# Patient Record
Sex: FEMALE | Race: WHITE | NOT HISPANIC OR LATINO | Employment: FULL TIME | URBAN - METROPOLITAN AREA
[De-identification: names, ages, dates, MRNs, and addresses within clinical notes are randomized per-mention and may not be internally consistent; named-entity substitution may affect disease eponyms.]

---

## 2017-09-14 ENCOUNTER — HOSPITAL ENCOUNTER (OUTPATIENT)
Dept: ULTRASOUND IMAGING | Facility: CLINIC | Age: 42
Discharge: HOME | End: 2017-09-14
Attending: OBSTETRICS & GYNECOLOGY

## 2017-09-14 ENCOUNTER — RECORDS - HEALTHEAST (OUTPATIENT)
Dept: ADMINISTRATIVE | Facility: OTHER | Age: 42
End: 2017-09-14

## 2017-09-14 ENCOUNTER — COMMUNICATION - HEALTHEAST (OUTPATIENT)
Dept: TELEHEALTH | Facility: CLINIC | Age: 42
End: 2017-09-14

## 2017-09-14 DIAGNOSIS — R10.2 PELVIC PAIN: ICD-10-CM

## 2017-10-18 ENCOUNTER — E-VISIT (OUTPATIENT)
Dept: FAMILY MEDICINE | Facility: CLINIC | Age: 42
End: 2017-10-18
Payer: COMMERCIAL

## 2017-10-18 ENCOUNTER — TELEPHONE (OUTPATIENT)
Dept: FAMILY MEDICINE | Facility: CLINIC | Age: 42
End: 2017-10-18

## 2017-10-18 DIAGNOSIS — Z71.84 COUNSELING ABOUT TRAVEL: ICD-10-CM

## 2017-10-18 PROCEDURE — 98969 ZZC NONPHYSICIAN ONLINE ASSESSMENT AND MANAGEMENT: CPT | Performed by: PHYSICIAN ASSISTANT

## 2017-10-18 NOTE — TELEPHONE ENCOUNTER
"Reason for Call:  Medication or medication refill:    Do you use a [facility]?  Name of the pharmacy and phone number for the current request:  [facility], [phone], [place] [phone]    Name of the medication requested: Ambien ( or generic)    Other request: Please call the patient if there are any problems with this request.  Patient states she has been to see the OBGYN for her \"girl\" physical within the last year.    Can we leave a detailed message on this number? YES    Phone number patient can be reached at: Cell number on file:    No relevant phone numbers on file.       Best Time: amytime    Call taken on 10/18/2017 at 11:47 AM by Sejal Fitzpatrick      "

## 2017-10-18 NOTE — MR AVS SNAPSHOT
After Visit Summary   10/18/2017    Dasia Reddy    MRN: [id number]           Patient Information     Date Of Birth          1975        Visit Information        Provider Department      10/18/2017 3:01 PM Jimenez Silva PA-C [facility]        Today's Diagnoses     Counseling about travel           Follow-ups after your visit        Who to contact     If you have questions or need follow up information about today's clinic visit or your schedule please contact [facility] directly at [phone].  Normal or non-critical lab and imaging results will be communicated to you by [employer], letter or phone within 4 business days after the clinic has received the results. If you do not hear from us within 7 days, please contact the clinic through [employer] or phone. If you have a critical or abnormal lab result, we will notify you by phone as soon as possible.  Submit refill requests through [email or web address] or call your pharmacy and they will forward the refill request to us. Please allow 3 business days for your refill to be completed.          Additional Information About Your Visit        MyChart Information     [email or web address] gives you secure access to your electronic health record. If you see a primary care provider, you can also send messages to your care team and make appointments. If you have questions, please call your primary care clinic.  If you do not have a primary care provider, please call [phone] and they will assist you.        Care EveryWhere ID     This is your Care EveryWhere ID. This could be used by other organizations to access your [facility] medical records  [id number]         Blood Pressure from Last 3 Encounters:   12/26/16 112/64   11/01/16 114/68   11/05/15 100/60    Weight from Last 3 Encounters:   12/26/16 156 lb (70.8 kg)   11/01/16 147 lb (66.7 kg)   11/05/15 151 lb (68.5 kg)              Today, you had the following     No orders found for  display         Where to get your medicines      Some of these will need a paper prescription and others can be bought over the counter.  Ask your nurse if you have questions.     Bring a paper prescription for each of these medications     zolpidem 10 MG tablet          Primary Care Provider Office Phone # Fax #    Jimenez Silva PA-C [phone]       [address] [facility] [address]        Equal Access to Services     MARYBETH DURBIN : Hadii aad ku hadasho Soomaali, waaxda luqadaha, qaybta kaalmada adeegyada, waxay idiin hayaan adeeg khalexissh latorin . So [facility] [phone].    ATENCIÓN: Si habla espstella, tiene a cotto disposición servicios gratuitos de asistencia lingüística. Llame al [phone].    We comply with applicable federal civil rights laws and [place] laws. We do not discriminate on the basis of race, color, national origin, age, disability, sex, sexual orientation, or gender identity.            Thank you!     Thank you for choosing [facility]  for your care. Our goal is always to provide you with excellent care. Hearing back from our patients is one way we can continue to improve our services. Please take a few minutes to complete the written survey that you may receive in the mail after your visit with us. Thank you!             Your Updated Medication List - Protect others around you: Learn how to safely use, store and throw away your medicines at www.disposemymeds.org.          This list is accurate as of: 10/18/17 11:59 PM.  Always use your most recent med list.                   Brand Name Dispense Instructions for use Diagnosis    DRYSOL 20 % external solution   Generic drug:  aluminum chloride     60 mL    apply every 2nd or 3rd night as needed    Hyperhidrosis       MIRENA (52 MG) 20 MCG/24HR IUD   Generic drug:  levonorgestrel           Multi-vitamin Tabs tablet   Generic drug:  multivitamin, therapeutic with minerals     30    1 TABLET DAILY         zolpidem 10 MG tablet    AMBIEN    15 tablet    1/2 tab as needed for travel related sleep issues    Counseling about travel

## 2017-10-18 NOTE — TELEPHONE ENCOUNTER
Would need a related visit because it has been a year for this particular specific controlled substance. Can do a simple E-Visit.  Thanks.  Biju

## 2017-10-19 RX ORDER — ZOLPIDEM TARTRATE 10 MG/1
TABLET ORAL
Qty: 15 TABLET | Refills: 3 | Status: SHIPPED | OUTPATIENT
Start: 2017-10-19 | End: 2018-11-02

## 2017-12-13 ENCOUNTER — TELEPHONE (OUTPATIENT)
Dept: FAMILY MEDICINE | Facility: CLINIC | Age: 42
End: 2017-12-13

## 2017-12-13 NOTE — TELEPHONE ENCOUNTER
"Dasia Reddy is a 42 year old female who calls with allergic reaction.    NURSING ASSESSMENT:  Description:  For the past 3 days, patient has had swelling around her eyes and a bumpy texture to the skin on her face and around her eyes. She states her upper eyelids are full of fluids. It is progressively getting better, but it is still \"pretty bad.\" She has tried benadryl and claritin with no improvement. Last week she had botox and another injection in her face, and she also tried a new powder mix for a smoothie. She wonders if these could be causes of her reaction. She denies dyspnea, chest pain, swelling of tongue, throat or mouth/lips, dizziness, vision changes, hoarseness. No new medication.  Onset/duration:  3 days  Precip. factors:  botox injections  Associated symptoms:  See description  Improves/worsens symptoms:  n/a  Pain scale (0-10)   0/10  Allergies:   Allergies   Allergen Reactions     Codecon-C [Codeine]      NURSING PLAN: Nursing advice to patient see provider in 2-4 hours    RECOMMENDED DISPOSITION:  See in 4 hours  Will comply with recommendation: No- Barriers to comply with plan of care patient wants to be seen in office, no appointments today. She will go to ER if symptoms progress..  If further questions/concerns or if symptoms do not improve, worsen or new symptoms develop, call your PCP or [facility] as soon as possible.      Guideline used:  Telephone Triage Protocols for Nurses, Fifth Edition, Elif Obrien \"allergic reaction\"    Ozzy Arellano RN    "

## 2017-12-13 NOTE — TELEPHONE ENCOUNTER
Reason for call:  Patient reporting a symptom    Symptom or request: Patient calling stating that she is having an allergic reaction to something. This is the 3rd day. She has swollen eyes and her skin is dry and feels like a bumpy rash but there is no discoloration. No shortness of breath.    Duration (how long have symptoms been present): see above    Have you been treated for this before? No    Additional comments: Patient uses [facility]    Phone Number patient can be reached at:  Home number on file [phone] (home)    Best Time:  any    Can we leave a detailed message on this number:  YES    Call taken on 12/13/2017 at 12:06 PM by Paris Hinkle

## 2017-12-14 ENCOUNTER — OFFICE VISIT (OUTPATIENT)
Dept: FAMILY MEDICINE | Facility: CLINIC | Age: 42
End: 2017-12-14
Payer: COMMERCIAL

## 2017-12-14 VITALS
HEART RATE: 70 BPM | WEIGHT: 154 LBS | OXYGEN SATURATION: 96 % | HEIGHT: 66 IN | BODY MASS INDEX: 24.75 KG/M2 | DIASTOLIC BLOOD PRESSURE: 62 MMHG | TEMPERATURE: 98.7 F | SYSTOLIC BLOOD PRESSURE: 108 MMHG

## 2017-12-14 DIAGNOSIS — T78.40XA ALLERGIC REACTION, INITIAL ENCOUNTER: Primary | ICD-10-CM

## 2017-12-14 PROCEDURE — 99213 OFFICE O/P EST LOW 20 MIN: CPT | Performed by: PHYSICIAN ASSISTANT

## 2017-12-14 NOTE — PROGRESS NOTES
"  SUBJECTIVE:   Dasia Reddy is a 42 year old female who presents to clinic today for the following health issues:  Concern - Facial Swelling   Onset: 4 days     Description:   Facial swelling for the past 4 days. She recently had Botox injections 7 days ago, she has had Botox in the past with not issues. Went to a new clinic this time. Also started using a new smoothie mix. Organifi.     Intensity: moderate    Progression of Symptoms:  improving    Accompanying Signs & Symptoms:  Swelling     Previous history of similar problem:   Yes-had an allergic reaction, possibly from a silk pillow she had recently bought.     Precipitating factors:   Worsened by: Unknown     Alleviating factors:  Improved by: None     Therapies Tried and outcome: Benadryl and Claritin have not helped     History of breaking out with eating seaweed.     Has been trying benadryl, claritin, and pepcid AC.     Problem list and histories reviewed & adjusted, as indicated.  Additional history: as documented    Labs reviewed in EPIC    Reviewed and updated as needed this visit by clinical staff     Reviewed and updated as needed this visit by Provider         ROS:  Constitutional, HEENT, cardiovascular, pulmonary, gi and gu systems are negative, except as otherwise noted.      OBJECTIVE:   /62 (BP Location: Right arm, Cuff Size: Adult Regular)  Pulse 70  Temp 98.7  F (37.1  C) (Oral)  Ht 5' 5.75\" (1.67 m)  Wt 154 lb (69.9 kg)  SpO2 96%  BMI 25.05 kg/m2  Body mass index is 25.05 kg/(m^2).  GENERAL: healthy, alert and no distress  HENT: ear canals and TM's normal, nose and mouth without ulcers or lesions  NECK: no adenopathy, no asymmetry, masses, or scars and thyroid normal to palpation  RESP: lungs clear to auscultation - no rales, rhonchi or wheezes  CV: regular rate and rhythm, normal S1 S2, no S3 or S4, no murmur, click or rub, no peripheral edema and peripheral pulses strong  SKIN: Has some mild facial swelling - mild erythema, no " hives, no keratosis, scaling, etc     ASSESSMENT/PLAN:     (T78.40XA) Allergic reaction, initial encounter  (primary encounter diagnosis)  Comment:   Plan: Unclear - she's had Botox, Juvederm, takes a few supplements / herbal mixes in the past week that are somewhat new. I reviewed the ingredients, most are fairly benign - I think the Juvederm might be the culprit because the meds / supplements she had used in the past, Botox she had done and Juvederm as well - I recommended that she hold off on the supplements for now - could reintroduce one at a time at a future date - should review allergic / facial reaction with her derm clinic.     Will do OTC Zantac and Zyrtec daily for a week or so. Warning symptoms of worsening condition discussed and patient shows good understanding.     HAKEEM HUNT PA-C  [facility]

## 2017-12-14 NOTE — MR AVS SNAPSHOT
"              After Visit Summary   12/14/2017    Dasia Reddy    MRN: [id number]           Patient Information     Date Of Birth          1975        Visit Information        Provider Department      12/14/2017 6:40 AM Jimneez Silva PA-C [facility]        Today's Diagnoses     Allergic reaction, initial encounter    -  1      Care Instructions    1. Zyrtec 10 mg daily and Zantac 150 mg, 1 pill twice daily for 1 week           Follow-ups after your visit        Who to contact     If you have questions or need follow up information about today's clinic visit or your schedule please contact [facility] directly at [phone].  Normal or non-critical lab and imaging results will be communicated to you by [employer], letter or phone within 4 business days after the clinic has received the results. If you do not hear from us within 7 days, please contact the clinic through [employer]art or phone. If you have a critical or abnormal lab result, we will notify you by phone as soon as possible.  Submit refill requests through [employer] or call your pharmacy and they will forward the refill request to us. Please allow 3 business days for your refill to be completed.          Additional Information About Your Visit        MyChart Information     [employer] gives you secure access to your electronic health record. If you see a primary care provider, you can also send messages to your care team and make appointments. If you have questions, please call your primary care clinic.  If you do not have a primary care provider, please call [phone] and they will assist you.        Care EveryWhere ID     This is your Care EveryWhere ID. This could be used by other organizations to access your [place] medical records  [id number]        Your Vitals Were     Pulse Temperature Height Pulse Oximetry BMI (Body Mass Index)       70 98.7  F (37.1  C) (Oral) 5' 5.75\" (1.67 m) 96% 25.05 kg/m2        Blood " Pressure from Last 3 Encounters:   12/14/17 108/62   12/26/16 112/64   11/01/16 114/68    Weight from Last 3 Encounters:   12/14/17 154 lb (69.9 kg)   12/26/16 156 lb (70.8 kg)   11/01/16 147 lb (66.7 kg)              Today, you had the following     No orders found for display       Primary Care Provider Office Phone # Fax #    Jimenez Silva PA-C [phone]       [address] [place] [address] [address]        Equal Access to Services     [facility]: Hadii aad ku hadasho Soomaali, waaxda luqadaha, qaybta kaalmada adeegyada, nori bhardwaj . So [facility] [phone].    ATENCIÓN: Si habla español, tiene a cotto disposición servicios gratuitos de asistencia lingüística. Llame al [phone].    We comply with applicable federal civil rights laws and [place] laws. We do not discriminate on the basis of race, color, national origin, age, disability, sex, sexual orientation, or gender identity.            Thank you!     Thank you for choosing [facility]  for your care. Our goal is always to provide you with excellent care. Hearing back from our patients is one way we can continue to improve our services. Please take a few minutes to complete the written survey that you may receive in the mail after your visit with us. Thank you!             Your Updated Medication List - Protect others around you: Learn how to safely use, store and throw away your medicines at www.disposemymeds.org.          This list is accurate as of: 12/14/17  7:17 AM.  Always use your most recent med list.                   Brand Name Dispense Instructions for use Diagnosis    DRYSOL 20 % external solution   Generic drug:  aluminum chloride     60 mL    apply every 2nd or 3rd night as needed    Hyperhidrosis       MIRENA (52 MG) 20 MCG/24HR IUD   Generic drug:  levonorgestrel           Multi-vitamin Tabs tablet   Generic drug:  multivitamin, therapeutic with minerals     30    1  TABLET DAILY        zolpidem 10 MG tablet    AMBIEN    15 tablet    1/2 tab as needed for travel related sleep issues    Counseling about travel

## 2018-03-15 ENCOUNTER — OFFICE VISIT (OUTPATIENT)
Dept: FAMILY MEDICINE | Facility: CLINIC | Age: 43
End: 2018-03-15
Payer: COMMERCIAL

## 2018-03-15 VITALS
WEIGHT: 152 LBS | OXYGEN SATURATION: 97 % | BODY MASS INDEX: 24.43 KG/M2 | TEMPERATURE: 98.6 F | HEIGHT: 66 IN | HEART RATE: 74 BPM | DIASTOLIC BLOOD PRESSURE: 67 MMHG | SYSTOLIC BLOOD PRESSURE: 100 MMHG

## 2018-03-15 DIAGNOSIS — Z01.818 PREOP GENERAL PHYSICAL EXAM: Primary | ICD-10-CM

## 2018-03-15 DIAGNOSIS — H02.403 PTOSIS OF BOTH EYELIDS: ICD-10-CM

## 2018-03-15 DIAGNOSIS — L98.7 REDUNDANT SKIN OF ABDOMEN: ICD-10-CM

## 2018-03-15 PROCEDURE — 99214 OFFICE O/P EST MOD 30 MIN: CPT | Performed by: PHYSICIAN ASSISTANT

## 2018-03-15 NOTE — PROGRESS NOTES
[address] [id number] [phone]  Dept: [phone]    PRE-OP EVALUATION:  Today's date: 3/15/2018    Dasia Reddy (: 1975) presents for pre-operative evaluation assessment as requested by Dr. Garza.  She requires evaluation and anesthesia risk assessment prior to undergoing surgery/procedure for treatment of eyelids .    Fax number for surgical facility: [phone]  Primary Physician: Jimenez Silva  Type of Anesthesia Anticipated: to be determined    Patient has a Health Care Directive or Living Will:  YES    Preop Questions 3/15/2018   Who is doing your surgery? dr garza   What are you having done? eyelids and fat removed   Date of Surgery/Procedure: 18   Facility or Hospital where procedure/surgery will be performed: [facility]   1.  Do you have a history of Heart attack, stroke, stent, coronary bypass surgery, or other heart surgery? No   2.  Do you ever have any pain or discomfort in your chest? No   3.  Do you have a history of  Heart Failure? No   4.   Are you troubled by shortness of breath when:  walking on a level surface, or up a slight hill, or at night? No   5.  Do you currently have a cold, bronchitis or other respiratory infection? No   6.  Do you have a cough, shortness of breath, or wheezing? No   7.  Do you sometimes get pains in the calves of your legs when you walk? No   8. Do you or anyone in your family have previous history of blood clots? No   9.  Do you or does anyone in your family have a serious bleeding problem such as prolonged bleeding following surgeries or cuts? No   10. Have you ever had problems with anemia or been told to take iron pills? No   11. Have you had any abnormal blood loss such as black, tarry or bloody stools, or abnormal vaginal bleeding? No   12. Have you ever had a blood transfusion? No   13. Have you or any of your relatives ever had problems with anesthesia? No   14.  Do you have sleep apnea, excessive snoring or daytime drowsiness? No   15. Do you have any prosthetic heart valves? No   16. Do you have prosthetic joints? No   17. Is there any chance that you may be pregnant? No         HPI:     HPI related to upcoming procedure:     Ptosis - bilateral eyelids - planned plastic surgery procedure     Abdominal tissue / adipose build up - planned abdominal removal     See problem list for active medical problems.  Problems all longstanding and stable, except as noted/documented.  See ROS for pertinent symptoms related to these conditions.                                                                                                  .    MEDICAL HISTORY:     Patient Active Problem List    Diagnosis Date Noted     CARDIOVASCULAR SCREENING; LDL GOAL LESS THAN 160 05/09/2010     Priority: Medium     Hyperhidrosis 04/19/2010     Priority: Medium     INJURY-RT FOOT 09/01/2005     Priority: Medium     September 1, 2005 - Fracture at 3rd MTP yrs ago.  REinjury.  Xrays show chronic changes.  Ice, elevate and NSAID's.  If ongoing issues, refer to Brad.        Past Medical History:   Diagnosis Date     Ovarian torsion      Past Surgical History:   Procedure Laterality Date     SURGICAL HISTORY OF -       right foot     SURGICAL HISTORY OF -   2009    ovarian torsion. Able to save ovary. laparoscopy     Current Outpatient Prescriptions   Medication Sig Dispense Refill     zolpidem (AMBIEN) 10 MG tablet 1/2 tab as needed for travel related sleep issues 15 tablet 3     MULTI-VITAMIN OR TABS 1 TABLET DAILY 30 0     MIRENA 20 MCG/24HR IU IUD   0     DRYSOL 20 % external solution apply every 2nd or 3rd night as needed (Patient not taking: Reported on 12/14/2017) 60 mL 5     OTC products: None, except as noted above    Allergies   Allergen Reactions     Codecon-C [Codeine]       Latex Allergy: NO    Social History   Substance Use Topics     Smoking status: Former Smoker     Quit date: 9/1/2000  "    Smokeless tobacco: Never Used     Alcohol use Yes      Comment: 5 drinks/week     History   Drug Use No       REVIEW OF SYSTEMS:   CONSTITUTIONAL: NEGATIVE for fever, chills, change in weight  INTEGUMENTARY/SKIN: NEGATIVE for worrisome rashes, moles or lesions  EYES: NEGATIVE for vision changes or irritation  ENT/MOUTH: NEGATIVE for ear, mouth and throat problems  RESP: NEGATIVE for significant cough or SOB  BREAST: NEGATIVE for masses, tenderness or discharge  CV: NEGATIVE for chest pain, palpitations or peripheral edema  GI: NEGATIVE for nausea, abdominal pain, heartburn, or change in bowel habits  : NEGATIVE for frequency, dysuria, or hematuria  MUSCULOSKELETAL: NEGATIVE for significant arthralgias or myalgia  NEURO: NEGATIVE for weakness, dizziness or paresthesias  ENDOCRINE: NEGATIVE for temperature intolerance, skin/hair changes  HEME: NEGATIVE for bleeding problems  PSYCHIATRIC: NEGATIVE for changes in mood or affect    EXAM:   /67 (BP Location: Right arm, Patient Position: Chair, Cuff Size: Adult Regular)  Pulse 74  Temp 98.6  F (37  C) (Oral)  Ht 5' 5.75\" (1.67 m)  Wt 152 lb (68.9 kg)  SpO2 97%  BMI 24.72 kg/m2    GENERAL APPEARANCE: healthy, alert and no distress     EYES: EOMI, PERRL     HENT: ear canals and TM's normal and nose and mouth without ulcers or lesions     NECK: no adenopathy, no asymmetry, masses, or scars and thyroid normal to palpation     RESP: lungs clear to auscultation - no rales, rhonchi or wheezes     CV: regular rates and rhythm, normal S1 S2, no S3 or S4 and no murmur, click or rub     ABDOMEN:  soft, nontender, no HSM or masses and bowel sounds normal     MS: extremities normal- no gross deformities noted, no evidence of inflammation in joints, FROM in all extremities.     SKIN: no suspicious lesions or rashes     NEURO: Normal strength and tone, sensory exam grossly normal, mentation intact and speech normal     PSYCH: mentation appears normal. and affect " normal/bright     LYMPHATICS: No cervical adenopathy    DIAGNOSTICS:   No labs or EKG required for low risk surgery (cataract, skin procedure, breast biopsy, etc)    Recent Labs   Lab Test  11/05/15   1030  12/26/14   0912 03/19/14   HGB  13.5  12.4   --    PLT   --   199   --    NA   --    --   139   POTASSIUM   --    --   4.4   CR   --    --   0.9        IMPRESSION:   Reason for surgery/procedure: Eyelid ptosis treatment / abdominal tissue removal   Diagnosis/reason for consult: Anesthesia clearance     The proposed surgical procedure is considered LOW risk.    REVISED CARDIAC RISK INDEX  The patient has the following serious cardiovascular risks for perioperative complications such as (MI, PE, VFib and 3  AV Block):  No serious cardiac risks  INTERPRETATION: 0 risks: Class I (very low risk - 0.4% complication rate)    The patient has the following additional risks for perioperative complications:  No identified additional risks      ICD-10-CM    1. Preop general physical exam Z01.818    2. Ptosis of both eyelids H02.403    3. Redundant skin of abdomen L98.7        RECOMMENDATIONS:     APPROVAL GIVEN to proceed with proposed procedure, without further diagnostic evaluation     Signed Electronically by: HAKEEM HUNT PA-C    Copy of this evaluation report is provided to requesting physician.    [facility] Preop Guidelines

## 2018-03-15 NOTE — MR AVS SNAPSHOT
After Visit Summary   3/15/2018    Dasia Reddy    MRN: [id number]           Patient Information     Date Of Birth          1975        Visit Information        Provider Department      3/15/2018 2:20 PM Jimenez Silva PA-C [facility]        Today's Diagnoses     Preop general physical exam    -  1    Ptosis of both eyelids        Redundant skin of abdomen          Care Instructions      Before Your Surgery      Call your surgeon if there is any change in your health. This includes signs of a cold or flu (such as a sore throat, runny nose, cough, rash or fever).    Do not smoke, drink alcohol or take over the counter medicine (unless your surgeon or primary care doctor tells you to) for the 24 hours before and after surgery.    If you take prescribed drugs: Follow your doctor s orders about which medicines to take and which to stop until after surgery.    Eating and drinking prior to surgery: follow the instructions from your surgeon    Take a shower or bath the night before surgery. Use the soap your surgeon gave you to gently clean your skin. If you do not have soap from your surgeon, use your regular soap. Do not shave or scrub the surgery site.  Wear clean pajamas and have clean sheets on your bed.           Follow-ups after your visit        Who to contact     If you have questions or need follow up information about today's clinic visit or your schedule please contact [facility] directly at [phone].  Normal or non-critical lab and imaging results will be communicated to you by MyChart, letter or phone within 4 business days after the clinic has received the results. If you do not hear from us within 7 days, please contact the clinic through [employer] or phone. If you have a critical or abnormal lab result, we will notify you by phone as soon as possible.  Submit refill requests through [employer] or call your pharmacy and they will forward the refill  "request to us. Please allow 3 business days for your refill to be completed.          Additional Information About Your Visit        MyChart Information     [employer] gives you secure access to your electronic health record. If you see a primary care provider, you can also send messages to your care team and make appointments. If you have questions, please call your primary care clinic.  If you do not have a primary care provider, please call [phone] and they will assist you.        Care EveryWhere ID     This is your Care EveryWhere ID. This could be used by other organizations to access your [place] medical records  [id number]        Your Vitals Were     Pulse Temperature Height Pulse Oximetry BMI (Body Mass Index)       74 98.6  F (37  C) (Oral) 5' 5.75\" (1.67 m) 97% 24.72 kg/m2        Blood Pressure from Last 3 Encounters:   03/15/18 100/67   12/14/17 108/62   12/26/16 112/64    Weight from Last 3 Encounters:   03/15/18 152 lb (68.9 kg)   12/14/17 154 lb (69.9 kg)   12/26/16 156 lb (70.8 kg)              Today, you had the following     No orders found for display       Primary Care Provider Office Phone # Fax #    Jimenez Silva PA-C [phone]       [address] [facility] [address]        Equal Access to Services     MARYBETH DURBIN : Hadii aad ku hadasho Soomaali, waaxda luqadaha, qaybta kaalmada adeegyada, nori fowler. So [facility] [phone].    ATENCIÓN: Si habla español, tiene a cotto disposición servicios gratuitos de asistencia lingüística. Llbirdie al [phone].    We comply with applicable federal civil rights laws and [place] laws. We do not discriminate on the basis of race, color, national origin, age, disability, sex, sexual orientation, or gender identity.            Thank you!     Thank you for choosing [facility]  for your care. Our goal is always to provide you with excellent care. Hearing back from our patients is " one way we can continue to improve our services. Please take a few minutes to complete the written survey that you may receive in the mail after your visit with us. Thank you!             Your Updated Medication List - Protect others around you: Learn how to safely use, store and throw away your medicines at www.disposemymeds.org.          This list is accurate as of 3/15/18  2:58 PM.  Always use your most recent med list.                   Brand Name Dispense Instructions for use Diagnosis    DRYSOL 20 % external solution   Generic drug:  aluminum chloride     60 mL    apply every 2nd or 3rd night as needed    Hyperhidrosis       MIRENA (52 MG) 20 MCG/24HR IUD   Generic drug:  levonorgestrel           Multi-vitamin Tabs tablet   Generic drug:  multivitamin, therapeutic with minerals     30    1 TABLET DAILY        zolpidem 10 MG tablet    AMBIEN    15 tablet    1/2 tab as needed for travel related sleep issues    Counseling about travel

## 2018-03-15 NOTE — NURSING NOTE
"Chief Complaint   Patient presents with     Pre-Op Exam       Initial /67 (BP Location: Right arm, Patient Position: Chair, Cuff Size: Adult Regular)  Pulse 74  Temp 98.6  F (37  C) (Oral)  Ht 5' 5.75\" (1.67 m)  Wt 152 lb (68.9 kg)  SpO2 97%  BMI 24.72 kg/m2 Estimated body mass index is 24.72 kg/(m^2) as calculated from the following:    Height as of this encounter: 5' 5.75\" (1.67 m).    Weight as of this encounter: 152 lb (68.9 kg).  Medication Reconciliation: complete   Gardenia Wren MA      "

## 2018-07-11 DIAGNOSIS — R06.00 DYSPNEA, UNSPECIFIED TYPE: Primary | ICD-10-CM

## 2018-08-08 DIAGNOSIS — R06.00 DYSPNEA, UNSPECIFIED TYPE: ICD-10-CM

## 2018-08-08 DIAGNOSIS — Z13.6 CARDIOVASCULAR SCREENING; LDL GOAL LESS THAN 160: Primary | ICD-10-CM

## 2018-08-08 LAB
6 MIN WALK (FT): 1630 FT
6 MIN WALK (M): 497 M

## 2018-08-13 ENCOUNTER — CARE COORDINATION (OUTPATIENT)
Dept: PULMONOLOGY | Facility: CLINIC | Age: 43
End: 2018-08-13

## 2018-08-13 NOTE — PROGRESS NOTES
Telephone Encounter: Outgoing    Reason for Outgoing Call: Dr Santana reviewed PFT's, look good, they are normal.     Nursing Action/Patient Instruction: Left message informing patient.

## 2018-09-01 ENCOUNTER — TRANSFERRED RECORDS (OUTPATIENT)
Dept: HEALTH INFORMATION MANAGEMENT | Facility: CLINIC | Age: 43
End: 2018-09-01

## 2018-09-25 LAB
DLCOUNC-%PRED-PRE: 98 %
DLCOUNC-PRE: 24.02 ML/MIN/MMHG
DLCOUNC-PRED: 24.33 ML/MIN/MMHG
ERV-%PRED-PRE: 97 %
ERV-PRE: 1.05 L
ERV-PRED: 1.07 L
EXPTIME-PRE: 6.03 SEC
FEF2575-%PRED-PRE: 135 %
FEF2575-PRE: 4.35 L/SEC
FEF2575-PRED: 3.21 L/SEC
FEFMAX-%PRED-PRE: 110 %
FEFMAX-PRE: 7.94 L/SEC
FEFMAX-PRED: 7.21 L/SEC
FEV1-%PRED-PRE: 108 %
FEV1-PRE: 3.41 L
FEV1FEV6-PRE: 90 %
FEV1FEV6-PRED: 84 %
FEV1FVC-PRE: 90 %
FEV1FVC-PRED: 82 %
FEV1SVC-PRE: 92 %
FEV1SVC-PRED: 81 %
FIFMAX-PRE: 5.28 L/SEC
FRCPLETH-%PRED-PRE: 88 %
FRCPLETH-PRE: 2.47 L
FRCPLETH-PRED: 2.78 L
FVC-%PRED-PRE: 98 %
FVC-PRE: 3.8 L
FVC-PRED: 3.87 L
IC-%PRED-PRE: 95 %
IC-PRE: 2.68 L
IC-PRED: 2.8 L
RVPLETH-%PRED-PRE: 83 %
RVPLETH-PRE: 1.42 L
RVPLETH-PRED: 1.7 L
TLCPLETH-%PRED-PRE: 98 %
TLCPLETH-PRE: 5.15 L
TLCPLETH-PRED: 5.23 L
VA-%PRED-PRE: 86 %
VA-PRE: 4.65 L
VC-%PRED-PRE: 96 %
VC-PRE: 3.73 L
VC-PRED: 3.87 L

## 2018-10-15 ENCOUNTER — TRANSFERRED RECORDS (OUTPATIENT)
Dept: HEALTH INFORMATION MANAGEMENT | Facility: CLINIC | Age: 43
End: 2018-10-15

## 2018-11-02 ENCOUNTER — OFFICE VISIT (OUTPATIENT)
Dept: FAMILY MEDICINE | Facility: CLINIC | Age: 43
End: 2018-11-02
Payer: COMMERCIAL

## 2018-11-02 VITALS
SYSTOLIC BLOOD PRESSURE: 110 MMHG | HEART RATE: 88 BPM | TEMPERATURE: 98.2 F | HEIGHT: 66 IN | BODY MASS INDEX: 25.27 KG/M2 | WEIGHT: 157.25 LBS | DIASTOLIC BLOOD PRESSURE: 68 MMHG

## 2018-11-02 DIAGNOSIS — Z00.00 ROUTINE GENERAL MEDICAL EXAMINATION AT A HEALTH CARE FACILITY: Primary | ICD-10-CM

## 2018-11-02 DIAGNOSIS — Z83.6 FAMILY HISTORY OF PULMONARY FIBROSIS: ICD-10-CM

## 2018-11-02 DIAGNOSIS — Z23 NEED FOR PROPHYLACTIC VACCINATION WITH TETANUS-DIPHTHERIA (TD): ICD-10-CM

## 2018-11-02 DIAGNOSIS — Z71.84 COUNSELING ABOUT TRAVEL: ICD-10-CM

## 2018-11-02 PROCEDURE — 36415 COLL VENOUS BLD VENIPUNCTURE: CPT | Performed by: PHYSICIAN ASSISTANT

## 2018-11-02 PROCEDURE — 80061 LIPID PANEL: CPT | Performed by: PHYSICIAN ASSISTANT

## 2018-11-02 PROCEDURE — 82947 ASSAY GLUCOSE BLOOD QUANT: CPT | Performed by: PHYSICIAN ASSISTANT

## 2018-11-02 PROCEDURE — 99396 PREV VISIT EST AGE 40-64: CPT | Performed by: PHYSICIAN ASSISTANT

## 2018-11-02 PROCEDURE — 84443 ASSAY THYROID STIM HORMONE: CPT | Performed by: PHYSICIAN ASSISTANT

## 2018-11-02 RX ORDER — ZOLPIDEM TARTRATE 10 MG/1
TABLET ORAL
Qty: 15 TABLET | Refills: 3 | Status: SHIPPED | OUTPATIENT
Start: 2018-11-02 | End: 2019-10-28

## 2018-11-02 ASSESSMENT — ENCOUNTER SYMPTOMS
ARTHRALGIAS: 0
PARESTHESIAS: 0
ABDOMINAL PAIN: 0
DYSURIA: 0
DIZZINESS: 0
CONSTIPATION: 0
DIARRHEA: 0
HEMATURIA: 0
NAUSEA: 0
NERVOUS/ANXIOUS: 0
HEARTBURN: 0
EYE PAIN: 0
JOINT SWELLING: 0
FEVER: 0
SHORTNESS OF BREATH: 0
WEAKNESS: 0
MYALGIAS: 0
HEADACHES: 0
FREQUENCY: 0
BREAST MASS: 1
HEMATOCHEZIA: 0
SORE THROAT: 0
COUGH: 0

## 2018-11-02 NOTE — PROGRESS NOTES
SUBJECTIVE:   CC: Dasia Reddy is an 43 year old woman who presents for preventive health visit.     Physical   Annual:     Getting at least 3 servings of Calcium per day:  Yes    Bi-annual eye exam:  Yes    Dental care twice a year:  Yes    Sleep apnea or symptoms of sleep apnea:  None    Diet:  Regular (no restrictions)    Frequency of exercise:  1 day/week    Duration of exercise:  Less than 15 minutes    Taking medications regularly:  Yes    Medication side effects:  None    Additional concerns today:  YES (Discuss if should she get the TDAP today or when she gets back from travel that she leaves for tomorrow.  Had breast imagining and biopsy done at [facility].)        Medication Followup of AMBIEN    Taking Medication as prescribed: yes - is taking a full tablet    Side Effects:  None    Medication Helping Symptoms:  yes     Today's PHQ-2 Score:   PHQ-2 ( 1999 Pf[employer]) 11/2/2018   Q1: Little interest or pleasure in doing things 0   Q2: Feeling down, depressed or hopeless 0   PHQ-2 Score 0   Q1: Little interest or pleasure in doing things Not at all   Q2: Feeling down, depressed or hopeless Not at all   PHQ-2 Score 0     Abuse: Current or Past(Physical, Sexual or Emotional)- No  Do you feel safe in your environment - Yes    Social History   Substance Use Topics     Smoking status: Former Smoker     Quit date: 9/1/2000     Smokeless tobacco: Never Used     Alcohol use Yes      Comment: 5 drinks/week     Alcohol Use 11/2/2018   If you drink alcohol do you typically have greater than 3 drinks per day OR greater than 7 drinks per week? Yes   No flowsheet data found.    Reviewed orders with patient.  Reviewed health maintenance and updated orders accordingly - Yes  Labs reviewed in EPIC    Patient under age 50, mutual decision reflected in health maintenance.      Pertinent mammograms are reviewed under the imaging tab.  History of abnormal Pap smear: NO - age 30- 65 PAP every 3 years recommended  PAP / HPV  "Latest Ref Rng & Units 10/15/2016   PAP Negative Negative     Reviewed and updated as needed this visit by clinical staff  Tobacco  Allergies  Meds  Med Hx  Surg Hx  Fam Hx  Soc Hx        Reviewed and updated as needed this visit by Provider  Allergies            Review of Systems   Constitutional: Negative for fever.   HENT: Negative for congestion, ear pain, hearing loss and sore throat.    Eyes: Negative for pain and visual disturbance.   Respiratory: Negative for cough and shortness of breath.    Cardiovascular: Negative for chest pain and peripheral edema.   Gastrointestinal: Negative for abdominal pain, constipation, diarrhea, heartburn, hematochezia and nausea.   Breasts:  Positive for breast mass. Negative for tenderness and discharge.   Genitourinary: Negative for dysuria, frequency, genital sores, hematuria, pelvic pain, urgency, vaginal bleeding and vaginal discharge.   Musculoskeletal: Negative for arthralgias, joint swelling and myalgias.   Skin: Negative for rash.   Neurological: Negative for dizziness, weakness, headaches and paresthesias.   Psychiatric/Behavioral: Negative for mood changes. The patient is not nervous/anxious.         OBJECTIVE:   /68 (BP Location: Right arm, Cuff Size: Adult Regular)  Pulse 88  Temp 98.2  F (36.8  C) (Oral)  Ht 5' 5.75\" (1.67 m)  Wt 157 lb 4 oz (71.3 kg)  BMI 25.57 kg/m2  Physical Exam  GENERAL: healthy, alert and no distress  EYES: Eyes grossly normal to inspection, PERRL and conjunctivae and sclerae normal  HENT: ear canals and TM's normal, nose and mouth without ulcers or lesions  NECK: no adenopathy, no asymmetry, masses, or scars and thyroid normal to palpation  RESP: lungs clear to auscultation - no rales, rhonchi or wheezes  CV: regular rate and rhythm, normal S1 S2, no S3 or S4, no murmur, click or rub, no peripheral edema and peripheral pulses strong  ABDOMEN: soft, nontender, no hepatosplenomegaly, no masses and bowel sounds normal  MS: no " "gross musculoskeletal defects noted, no edema  SKIN: no suspicious lesions or rashes  NEURO: Normal strength and tone, mentation intact and speech normal  PSYCH: mentation appears normal, affect normal/bright  LYMPH: no cervical, supraclavicular, axillary, or inguinal adenopathy    Diagnostic Test Results:  none     ASSESSMENT/PLAN:   (Z00.00) Routine general medical examination at a health care facility  (primary encounter diagnosis)  Comment: Well person   Plan: TSH, Lipid panel reflex to direct LDL Fasting,         Glucose        Reviewed her current contraception, hormone challenges, some pre-menopausal type symptoms, etc     (Z23) Need for prophylactic vaccination with tetanus-diphtheria (Td)  Comment:   Plan: Deferred until after her trip    (Z71.89) Counseling about travel  Comment:   Plan: zolpidem (AMBIEN) 10 MG tablet        Rare, judicious use. Refills    (Z83.6) Family history of pulmonary fibrosis  Comment:   Plan: GENETICS REFERRAL        Father, grandfather, cause genetic. Will refer to genetics clinic.     COUNSELING:  Reviewed preventive health counseling, as reflected in patient instructions    BP Readings from Last 1 Encounters:   11/02/18 110/68     Estimated body mass index is 25.57 kg/(m^2) as calculated from the following:    Height as of this encounter: 5' 5.75\" (1.67 m).    Weight as of this encounter: 157 lb 4 oz (71.3 kg).           reports that she quit smoking about 18 years ago. She has never used smokeless tobacco.      Counseling Resources:  ATP IV Guidelines  Pooled Cohorts Equation Calculator  Breast Cancer Risk Calculator  FRAX Risk Assessment  ICSI Preventive Guidelines  Dietary Guidelines for Americans, 2010  USDA's MyPlate  ASA Prophylaxis  Lung CA Screening    HAKEEM HUNT PA-C  [facility]  Answers for HPI/ROS submitted by the patient on 11/2/2018   PHQ-2 Score: 0    "

## 2018-11-02 NOTE — Clinical Note
Please abstract the following data from this visit with this patient into the appropriate field in Epic:  Mammogram done on this date: 9/2018 (approximately), by this group: [facility], results were Negative / normal.

## 2018-11-02 NOTE — MR AVS SNAPSHOT
After Visit Summary   11/2/2018    Dasia Reddy    MRN: [id number]           Patient Information     Date Of Birth          1975        Visit Information        Provider Department      11/2/2018 10:00 AM Jimenez Silva PA-C [facility]        Today's Diagnoses     Routine general medical examination at a health care facility    -  1    Need for prophylactic vaccination with tetanus-diphtheria (Td)        Counseling about travel        Family history of pulmonary fibrosis          Care Instructions      Preventive Health Recommendations  Female Ages 40 to 49    Yearly exam:     See your health care provider every year in order to  1. Review health changes.   2. Discuss preventive care.    3. Review your medicines if your doctor prescribed any.      Get a Pap test every three years (unless you have an abnormal result and your provider advises testing more often).      If you get Pap tests with HPV test, you only need to test every 5 years, unless you have an abnormal result. You do not need a Pap test if your uterus was removed (hysterectomy) and you have not had cancer.      You should be tested each year for STDs (sexually transmitted diseases), if you're at risk.     Ask your doctor if you should have a mammogram.      Have a colonoscopy (test for colon cancer) if someone in your family has had colon cancer or polyps before age 50.       Have a cholesterol test every 5 years.       Have a diabetes test (fasting glucose) after age 45. If you are at risk for diabetes, you should have this test every 3 years.    Shots: Get a flu shot each year. Get a tetanus shot every 10 years.     Nutrition:     Eat at least 5 servings of fruits and vegetables each day.    Eat whole-grain bread, whole-wheat pasta and brown rice instead of white grains and rice.    Get adequate Calcium and Vitamin D.      Lifestyle    Exercise at least 150 minutes a week (an average of 30 minutes a day, 5  days a week). This will help you control your weight and prevent disease.    Limit alcohol to one drink per day.    No smoking.     Wear sunscreen to prevent skin cancer.    See your dentist every six months for an exam and cleaning.          Follow-ups after your visit        Additional Services     GENETICS REFERRAL       Your provider has referred you to: [facility]: Adult Genetics Clinic [facility] [phone]   [email or web address]    Please be aware that coverage of these services is subject to the terms and limitations of your health insurance plan.  Call member services at your health plan with any benefit or coverage questions.      Please bring the following with you to your appointment:    (1) Any X-Rays, CTs or MRIs which have been performed.  Contact the facility where they were done to arrange for  prior to your scheduled appointment.   (2) List of current medications   (3) This referral request   (4) Any documents/labs given to you for this referral                  Who to contact     If you have questions or need follow up information about today's clinic visit or your schedule please contact [facility] [place] directly at [phone].  Normal or non-critical lab and imaging results will be communicated to you by MyChart, letter or phone within 4 business days after the clinic has received the results. If you do not hear from us within 7 days, please contact the clinic through MyChart or phone. If you have a critical or abnormal lab result, we will notify you by phone as soon as possible.  Submit refill requests through [employer] or call your pharmacy and they will forward the refill request to us. Please allow 3 business days for your refill to be completed.          Additional Information About Your Visit        MyChart Information     [employer] gives you secure access to your electronic health record. If you see a primary care provider, you  "can also send messages to your care team and make appointments. If you have questions, please call your primary care clinic.  If you do not have a primary care provider, please call [phone] and they will assist you.        Care EveryWhere ID     This is your Care EveryWhere ID. This could be used by other organizations to access your Mobile medical records  [id number]        Your Vitals Were     Pulse Temperature Height BMI (Body Mass Index)          88 98.2  F (36.8  C) (Oral) 5' 5.75\" (1.67 m) 25.57 kg/m2         Blood Pressure from Last 3 Encounters:   11/02/18 110/68   03/15/18 100/67   12/14/17 108/62    Weight from Last 3 Encounters:   11/02/18 157 lb 4 oz (71.3 kg)   03/15/18 152 lb (68.9 kg)   12/14/17 154 lb (69.9 kg)              We Performed the Following     GENETICS REFERRAL     Glucose     Lipid panel reflex to direct LDL Fasting     TSH          Where to get your medicines      Some of these will need a paper prescription and others can be bought over the counter.  Ask your nurse if you have questions.     Bring a paper prescription for each of these medications     zolpidem 10 MG tablet          Primary Care Provider Office Phone # Fax #    Jimenez Silva PA-C [phone]       [facility] [facility] [address]        Equal Access to Services     MARYBETH DURBIN AH: Hadii aad ku hadasho Sotheo, waaxda luqadaha, qaybta kaalmada rufus, nori fowler. So [facility] [phone].    ATENCIÓN: Si habla español, tiene a cotto disposición servicios gratuitos de asistencia lingüística. Stef al [phone].    We comply with applicable federal civil rights laws and [place] laws. We do not discriminate on the basis of race, color, national origin, age, disability, sex, sexual orientation, or gender identity.            Thank you!     Thank you for choosing [facility]  for your care. Our goal is always to provide you with excellent " care. Hearing back from our patients is one way we can continue to improve our services. Please take a few minutes to complete the written survey that you may receive in the mail after your visit with us. Thank you!             Your Updated Medication List - Protect others around you: Learn how to safely use, store and throw away your medicines at www.disposemymeds.org.          This list is accurate as of 11/2/18 10:46 AM.  Always use your most recent med list.                   Brand Name Dispense Instructions for use Diagnosis    MIRENA (52 MG) 20 MCG/24HR IUD   Generic drug:  levonorgestrel           Multi-vitamin Tabs tablet   Generic drug:  multivitamin, therapeutic with minerals     30    1 TABLET DAILY        zolpidem 10 MG tablet    AMBIEN    15 tablet    1/2 tab as needed for travel related sleep issues    Counseling about travel

## 2018-11-03 LAB
CHOLEST SERPL-MCNC: 210 MG/DL
GLUCOSE SERPL-MCNC: 77 MG/DL (ref 70–99)
HDLC SERPL-MCNC: 83 MG/DL
LDLC SERPL CALC-MCNC: 117 MG/DL
NONHDLC SERPL-MCNC: 127 MG/DL
TRIGL SERPL-MCNC: 49 MG/DL
TSH SERPL DL<=0.005 MIU/L-ACNC: 1.41 MU/L (ref 0.4–4)

## 2019-03-21 ENCOUNTER — OFFICE VISIT (OUTPATIENT)
Dept: FAMILY MEDICINE | Facility: CLINIC | Age: 44
End: 2019-03-21
Payer: COMMERCIAL

## 2019-03-21 VITALS
SYSTOLIC BLOOD PRESSURE: 114 MMHG | OXYGEN SATURATION: 99 % | WEIGHT: 155 LBS | TEMPERATURE: 97.6 F | DIASTOLIC BLOOD PRESSURE: 70 MMHG | HEIGHT: 67 IN | BODY MASS INDEX: 24.33 KG/M2

## 2019-03-21 DIAGNOSIS — J01.90 ACUTE SINUSITIS WITH SYMPTOMS > 10 DAYS: Primary | ICD-10-CM

## 2019-03-21 PROCEDURE — 99213 OFFICE O/P EST LOW 20 MIN: CPT | Performed by: NURSE PRACTITIONER

## 2019-03-21 ASSESSMENT — MIFFLIN-ST. JEOR: SCORE: 1391.74

## 2019-03-21 NOTE — PROGRESS NOTES
SUBJECTIVE:   Dasia Reddy is a 43 year old female who presents to clinic today for the following health issues:      Acute Illness   Acute illness concerns: started off with a cough  Onset: 5-6 weeks ago    Fever: YES, fever 2 days ago. Felt warm. Didn't actually took temp.    Chills/Sweats: YES, chills no sweats    Headache (location?): YES, temple area    Sinus Pressure:YES. Lots of pressure     Conjunctivitis:  no    Ear Pain: YES- right, intermittent     Rhinorrhea: YES, yellow to clear    Congestion: YES, nasal     Sore Throat: YES, gone away. This was last week     Cough: YES, phlegms yellow or clear    Wheeze: no     Decreased Appetite: YES    Nausea: no     Vomiting: no     Diarrhea:  no     Dysuria/Freq.: no     Fatigue/Achiness: YES    Sick/Strep Exposure: no      Therapies Tried and outcome: rest, fluids nyquil/dayquil  No shortness of breath.  Suddenly worsened within the past week.    Problem list and histories reviewed & adjusted, as indicated.  Additional history: as documented    Patient Active Problem List   Diagnosis     INJURY-RT FOOT     Hyperhidrosis     CARDIOVASCULAR SCREENING; LDL GOAL LESS THAN 160     Family history of pulmonary fibrosis     Past Surgical History:   Procedure Laterality Date     SURGICAL HISTORY OF -       right foot     SURGICAL HISTORY OF -       ovarian torsion. Able to save ovary. laparoscopy       Social History     Tobacco Use     Smoking status: Former Smoker     Last attempt to quit: 2000     Years since quittin.5     Smokeless tobacco: Never Used   Substance Use Topics     Alcohol use: Yes     Comment: 5 drinks/week     Family History   Problem Relation Age of Onset     Hypertension Father      Diabetes Other      C.A.D. No family hx of      Cerebrovascular Disease No family hx of      Breast Cancer No family hx of      Cancer - colorectal No family hx of      Prostate Cancer No family hx of          Current Outpatient Medications   Medication Sig  "Dispense Refill            MIRENA 20 MCG/24HR IU IUD   0     MULTI-VITAMIN OR TABS 1 TABLET DAILY 30 0     zolpidem (AMBIEN) 10 MG tablet 1/2 tab as needed for travel related sleep issues 15 tablet 3     Allergies   Allergen Reactions     Codecon-C [Codeine] Nausea and Vomiting     BP Readings from Last 3 Encounters:   03/21/19 114/70   11/02/18 110/68   03/15/18 100/67    Wt Readings from Last 3 Encounters:   03/21/19 70.3 kg (155 lb)   11/02/18 71.3 kg (157 lb 4 oz)   03/15/18 68.9 kg (152 lb)                    Reviewed and updated as needed this visit by clinical staff  Tobacco  Allergies  Meds  Problems  Med Hx  Surg Hx  Fam Hx       Reviewed and updated as needed this visit by Provider  Tobacco  Allergies  Meds  Problems  Med Hx  Surg Hx  Fam Hx         ROS:  Constitutional, HEENT, cardiovascular, pulmonary, gi and gu systems are negative, except as otherwise noted.    OBJECTIVE:     /70 (BP Location: Right arm, Patient Position: Sitting, Cuff Size: Adult Regular)   Temp 97.6  F (36.4  C) (Oral)   Ht 1.695 m (5' 6.75\")   Wt 70.3 kg (155 lb)   SpO2 99%   BMI 24.46 kg/m    Body mass index is 24.46 kg/m .  GENERAL: healthy, alert and no distress  EYES: Eyes grossly normal to inspection, PERRL and conjunctivae and sclerae normal  HENT: normal cephalic/atraumatic, ear canals and TM's normal, nose and mouth without ulcers or lesions, rhinorrhea clear, oropharynx clear, oral mucous membranes moist and sinuses: not tender  NECK: no adenopathy and no asymmetry, masses, or scars  RESP: lungs clear to auscultation - no rales, rhonchi or wheezes  CV: regular rate and rhythm, normal S1 S2, no S3 or S4, no murmur, click or rub  PSYCH: mentation appears normal, affect normal/bright    ASSESSMENT/PLAN:       1. Acute sinusitis with symptoms > 10 days    - amoxicillin-clavulanate (AUGMENTIN) 875-125 MG tablet; Take 1 tablet by mouth 2 times daily for 5 days  Dispense: 10 tablet; Refill: 0  - Supportive " cares discussed.  - Start Flonase nasal spray.    Return in about 2 weeks (around 4/4/2019) for if symptoms worsen or fail to improve.    Joanne Stanley NP  [facility]

## 2019-03-21 NOTE — PATIENT INSTRUCTIONS
Flonase nasal spray    Patient Education     Sinusitis (Antibiotic Treatment)    The sinuses are air-filled spaces within the bones of the face. They connect to the inside of the nose. Sinusitis is an inflammation of the tissue that lines the sinuses. Sinusitis can occur during a cold. It can also happen due to allergies to pollens and other particles in the air. Sinusitis can cause symptoms of sinus congestion and a feeling of fullness. A sinus infection causes fever, headache, and facial pain. There is often green or yellow fluid draining from the nose or into the back of the throat (post-nasal drip). You have been given antibiotics to treat this condition.  Home care    Take the full course of antibiotics as instructed. Do not stop taking them, even when you feel better.    Drink plenty of water, hot tea, and other liquids. This may help thin nasal mucus. It also may help your sinuses drain fluids.    Heat may help soothe painful areas of your face. Use a towel soaked in hot water. Or,  the shower and direct the warm spray onto your face. Using a vaporizer along with a menthol rub at night may also help soothe symptoms.     An expectorant with guaifenesin may help thin nasal mucus and help your sinuses drain fluids.    You can use an over-the-counter decongestant, unless a similar medicine was prescribed to you. Nasal sprays work the fastest. Use one that contains phenylephrine or oxymetazoline. First blow your nose gently. Then use the spray. Do not use these medicines more often than directed on the label. If you do, your symptoms may get worse. You may also take pills that contain pseudoephedrine. Don t use products that combine multiple medicines. This is because side effects may be increased. Read labels. You can also ask the pharmacist for help. (People with high blood pressure should not use decongestants. They can raise blood pressure.)    Over-the-counter antihistamines may help if allergies  contributed to your sinusitis.      Do not use nasal rinses or irrigation during an acute sinus infection, unless your healthcare provider tells you to. Rinsing may spread the infection to other areas in your sinuses.    Use acetaminophen or ibuprofen to control pain, unless another pain medicine was prescribed to you. If you have chronic liver or kidney disease or ever had a stomach ulcer, talk with your healthcare provider before using these medicines. (Aspirin should never be taken by anyone under age 18 who is ill with a fever. It may cause severe liver damage.)    Don't smoke. This can make symptoms worse.  Follow-up care  Follow up with your healthcare provider or our staff if you are better in 1 week.  When to seek medical advice  Call your healthcare provider if any of these occur:    Facial pain or headache that gets worse    Stiff neck    Unusual drowsiness or confusion    Swelling of your forehead or eyelids    Vision problems, such as blurred or double vision    Fever of 100.4 F (38 C) or higher, or as directed by your healthcare provider    Seizure    Breathing problems    Symptoms don't go away in 10 days  Prevention  Here are steps you can take to help prevent an infection:    Keep good hand washing habits.    Don t have close contact with people who have sore throats, colds, or other upper respiratory infections.    Don t smoke, and stay away from secondhand smoke.    Stay up to date with of your vaccines.  Date Last Reviewed: 11/1/2017 2000-2018 [employer] [address] [place] [address]. All rights reserved. This information is not intended as a substitute for professional medical care. Always follow your healthcare professional's instructions.

## 2019-10-17 ENCOUNTER — MEDICAL CORRESPONDENCE (OUTPATIENT)
Dept: HEALTH INFORMATION MANAGEMENT | Facility: CLINIC | Age: 44
End: 2019-10-17

## 2019-10-22 DIAGNOSIS — N95.1 SYMPTOMATIC MENOPAUSAL OR FEMALE CLIMACTERIC STATES: Primary | ICD-10-CM

## 2019-10-28 DIAGNOSIS — Z71.84 COUNSELING ABOUT TRAVEL: ICD-10-CM

## 2019-10-28 RX ORDER — ZOLPIDEM TARTRATE 10 MG/1
TABLET ORAL
Qty: 15 TABLET | Refills: 3 | Status: SHIPPED | OUTPATIENT
Start: 2019-10-28 | End: 2022-10-27

## 2019-11-03 ENCOUNTER — HEALTH MAINTENANCE LETTER (OUTPATIENT)
Age: 44
End: 2019-11-03

## 2019-11-25 DIAGNOSIS — N95.1 SYMPTOMATIC MENOPAUSAL OR FEMALE CLIMACTERIC STATES: ICD-10-CM

## 2019-11-25 LAB
ESTRADIOL SERPL-MCNC: 33 PG/ML
FSH SERPL-ACNC: 7.7 IU/L

## 2019-11-25 PROCEDURE — 84403 ASSAY OF TOTAL TESTOSTERONE: CPT | Performed by: NURSE PRACTITIONER

## 2019-11-25 PROCEDURE — 82670 ASSAY OF TOTAL ESTRADIOL: CPT | Performed by: NURSE PRACTITIONER

## 2019-11-25 PROCEDURE — 83001 ASSAY OF GONADOTROPIN (FSH): CPT | Performed by: NURSE PRACTITIONER

## 2019-11-25 PROCEDURE — 36415 COLL VENOUS BLD VENIPUNCTURE: CPT | Performed by: NURSE PRACTITIONER

## 2019-11-28 LAB — TESTOST SERPL-MCNC: 121 NG/DL (ref 8–60)

## 2019-12-05 ENCOUNTER — TELEPHONE (OUTPATIENT)
Dept: FAMILY MEDICINE | Facility: CLINIC | Age: 44
End: 2019-12-05

## 2020-02-10 ENCOUNTER — HEALTH MAINTENANCE LETTER (OUTPATIENT)
Age: 45
End: 2020-02-10

## 2020-03-05 DIAGNOSIS — N95.1 SYMPTOMATIC MENOPAUSAL OR FEMALE CLIMACTERIC STATES: ICD-10-CM

## 2020-03-05 LAB
ESTRADIOL SERPL-MCNC: 57 PG/ML
FSH SERPL-ACNC: 8.1 IU/L

## 2020-03-05 PROCEDURE — 36415 COLL VENOUS BLD VENIPUNCTURE: CPT | Performed by: NURSE PRACTITIONER

## 2020-03-05 PROCEDURE — 84403 ASSAY OF TOTAL TESTOSTERONE: CPT | Performed by: NURSE PRACTITIONER

## 2020-03-05 PROCEDURE — 82670 ASSAY OF TOTAL ESTRADIOL: CPT | Performed by: NURSE PRACTITIONER

## 2020-03-05 PROCEDURE — 83001 ASSAY OF GONADOTROPIN (FSH): CPT | Performed by: NURSE PRACTITIONER

## 2020-03-09 DIAGNOSIS — Z00.00 ROUTINE GENERAL MEDICAL EXAMINATION AT A HEALTH CARE FACILITY: Primary | ICD-10-CM

## 2020-03-09 DIAGNOSIS — N95.1 SYMPTOMATIC MENOPAUSAL OR FEMALE CLIMACTERIC STATES: Primary | ICD-10-CM

## 2020-03-10 LAB — TESTOST SERPL-MCNC: 101 NG/DL (ref 8–60)

## 2020-06-11 DIAGNOSIS — N95.1 SYMPTOMATIC MENOPAUSAL OR FEMALE CLIMACTERIC STATES: ICD-10-CM

## 2020-06-11 LAB
ESTRADIOL SERPL-MCNC: 98 PG/ML
FSH SERPL-ACNC: 6 IU/L

## 2020-06-11 PROCEDURE — 82670 ASSAY OF TOTAL ESTRADIOL: CPT | Performed by: NURSE PRACTITIONER

## 2020-06-11 PROCEDURE — 84403 ASSAY OF TOTAL TESTOSTERONE: CPT | Performed by: NURSE PRACTITIONER

## 2020-06-11 PROCEDURE — 36415 COLL VENOUS BLD VENIPUNCTURE: CPT | Performed by: NURSE PRACTITIONER

## 2020-06-11 PROCEDURE — 83001 ASSAY OF GONADOTROPIN (FSH): CPT | Performed by: NURSE PRACTITIONER

## 2020-06-13 LAB — TESTOST SERPL-MCNC: 113 NG/DL (ref 8–60)

## 2020-07-07 ENCOUNTER — E-VISIT (OUTPATIENT)
Dept: FAMILY MEDICINE | Facility: CLINIC | Age: 45
End: 2020-07-07
Payer: COMMERCIAL

## 2020-07-07 DIAGNOSIS — R30.0 DYSURIA: Primary | ICD-10-CM

## 2020-07-07 PROCEDURE — 99421 OL DIG E/M SVC 5-10 MIN: CPT | Performed by: NURSE PRACTITIONER

## 2020-07-07 RX ORDER — PHENAZOPYRIDINE HYDROCHLORIDE 100 MG/1
100 TABLET, FILM COATED ORAL 3 TIMES DAILY PRN
Qty: 6 TABLET | Refills: 0 | Status: SHIPPED | OUTPATIENT
Start: 2020-07-07 | End: 2021-07-29

## 2020-07-07 NOTE — PATIENT INSTRUCTIONS
Thank you for choosing us for your care. I have placed an order for a prescription so that you can start treatment. View your full visit summary for details by clicking on the link below. Your pharmacist will able to address any questions you may have about the medication.     If you re not feeling better within 2-3 days, please schedule an appointment.  You can schedule an appointment right here in [facility], or call [phone]  If the visit is for the same symptoms as your e-visit, we ll refund the cost of your e-visit if seen within seven days.    Thank you for choosing us for your care. Given your symptoms, I would like you to do a lab-only visit to determine what is causing them.  I have placed the orders.  Please schedule an appointment with the lab right here in [facility], or call [phone].  I will let you know when the results are back and next steps to take.    Urinary Tract Infections in Women    Urinary tract infections (UTIs) are most often caused by bacteria. These bacteria enter the urinary tract. The bacteria may come from outside the body. Or they may travel from the skin outside the rectum or vagina into the urethra. Female anatomy makes it easy for bacteria from the bowel to enter a woman s urinary tract, which is the most common source of UTI. This means women develop UTIs more often than men. Pain in or around the urinary tract is a common UTI symptom. But the only way to know for sure if you have a UTI for the healthcare provider to test your urine. The two tests that may be done are the urinalysis and urine culture.  Types of UTIs    Cystitis. A bladder infection (cystitis) is the most common UTI in women. You may have urgent or frequent urination. You may also have pain, burning when you urinate, and bloody urine.    Urethritis. This is an inflamed urethra, which is the tube that carries urine from the bladder to outside the body. You may have lower stomach or back pain. You may also have  urgent or frequent urination.    Pyelonephritis. This is a kidney infection. If not treated, it can be serious and damage your kidneys. In severe cases, you may need to stay in the hospital. You may have a fever and lower back pain.  Medicines to treat a UTI  Most UTIs are treated with antibiotics. These kill the bacteria. The length of time you need to take them depends on the type of infection. It may be as short as 3 days. If you have repeated UTIs, you may need a low-dose antibiotic for several months. Take antibiotics exactly as directed. Don t stop taking them until all of the medicine is gone. If you stop taking the antibiotic too soon, the infection may not go away. You may also develop a resistance to the antibiotic. This can make it much harder to treat.  Lifestyle changes to treat and prevent UTIs  The lifestyle changes below will help get rid of your UTI. They may also help prevent future UTIs.    Drink plenty of fluids. This includes water, juice, or other caffeine-free drinks. Fluids help flush bacteria out of your body.    Empty your bladder. Always empty your bladder when you feel the urge to urinate. And always urinate before going to sleep. Urine that stays in your bladder can lead to infection. Try to urinate before and after sex as well.    Practice good personal hygiene. Wipe yourself from front to back after using the toilet. This helps keep bacteria from getting into the urethra.    Use condoms during sex. These help prevent UTIs caused by sexually transmitted bacteria. Also don't use spermicides during sex. These can increase the risk for UTIs. Choose other forms of birth control instead. For women who tend to get UTIs after sex, a low-dose of a preventive antibiotic may be used. Be sure to discuss this option with your healthcare provider.    Follow up with your healthcare provider as directed. He or she may test to make sure the infection has cleared. If needed, more treatment may be  started.  Date Last Reviewed: 1/1/2017 2000-2019 [employer]. [address] [place] [address], [place] [address]. All rights reserved. This information is not intended as a substitute for professional medical care. Always follow your healthcare professional's instructions.

## 2020-07-16 ENCOUNTER — ANCILLARY PROCEDURE (OUTPATIENT)
Dept: GENERAL RADIOLOGY | Facility: CLINIC | Age: 45
End: 2020-07-16
Attending: PHYSICIAN ASSISTANT
Payer: COMMERCIAL

## 2020-07-16 ENCOUNTER — OFFICE VISIT (OUTPATIENT)
Dept: FAMILY MEDICINE | Facility: CLINIC | Age: 45
End: 2020-07-16
Payer: COMMERCIAL

## 2020-07-16 VITALS
SYSTOLIC BLOOD PRESSURE: 122 MMHG | HEART RATE: 78 BPM | WEIGHT: 162 LBS | OXYGEN SATURATION: 98 % | BODY MASS INDEX: 25.43 KG/M2 | DIASTOLIC BLOOD PRESSURE: 60 MMHG | TEMPERATURE: 98.6 F | HEIGHT: 67 IN

## 2020-07-16 DIAGNOSIS — M25.561 ACUTE PAIN OF RIGHT KNEE: ICD-10-CM

## 2020-07-16 DIAGNOSIS — M25.561 ACUTE PAIN OF RIGHT KNEE: Primary | ICD-10-CM

## 2020-07-16 DIAGNOSIS — R30.0 DYSURIA: ICD-10-CM

## 2020-07-16 LAB
ALBUMIN UR-MCNC: NEGATIVE MG/DL
APPEARANCE UR: CLEAR
BACTERIA #/AREA URNS HPF: ABNORMAL /HPF
BILIRUB UR QL STRIP: NEGATIVE
COLOR UR AUTO: YELLOW
GLUCOSE UR STRIP-MCNC: NEGATIVE MG/DL
HGB UR QL STRIP: ABNORMAL
KETONES UR STRIP-MCNC: NEGATIVE MG/DL
LEUKOCYTE ESTERASE UR QL STRIP: NEGATIVE
NITRATE UR QL: NEGATIVE
NON-SQ EPI CELLS #/AREA URNS LPF: ABNORMAL /LPF
PH UR STRIP: 6 PH (ref 5–7)
RBC #/AREA URNS AUTO: ABNORMAL /HPF
SOURCE: ABNORMAL
SP GR UR STRIP: >1.03 (ref 1–1.03)
UROBILINOGEN UR STRIP-ACNC: 0.2 EU/DL (ref 0.2–1)
WBC #/AREA URNS AUTO: ABNORMAL /HPF

## 2020-07-16 PROCEDURE — 73560 X-RAY EXAM OF KNEE 1 OR 2: CPT | Mod: LT

## 2020-07-16 PROCEDURE — 81001 URINALYSIS AUTO W/SCOPE: CPT | Performed by: NURSE PRACTITIONER

## 2020-07-16 PROCEDURE — 99213 OFFICE O/P EST LOW 20 MIN: CPT | Performed by: PHYSICIAN ASSISTANT

## 2020-07-16 ASSESSMENT — PAIN SCALES - GENERAL: PAINLEVEL: MILD PAIN (3)

## 2020-07-16 ASSESSMENT — MIFFLIN-ST. JEOR: SCORE: 1413.49

## 2020-07-16 NOTE — PROGRESS NOTES
"Subjective     Dasia Reddy is a 45 year old female who presents to clinic today for the following health issues:    HPI       Musculoskeletal problem/pain      Duration: left knee x 3 weeks and right knee x 2 days    Description  Location: bilateral knee     Intensity:  3/10    Accompanying signs and symptoms: swelling    History  Previous similar problem: no   Previous evaluation:  none    Precipitating or alleviating factors:  Trauma or overuse: YES-injured knees  Aggravating factors include: side ways movement and extended the knee    Therapies tried and outcome: ice and compress    Patient frequently wakeboards and believes she sustained injury during this activity.    Review of Systems   Constitutional, HEENT, cardiovascular, pulmonary, gi and gu systems are negative, except as otherwise noted.      Objective    /60   Pulse 78   Temp 98.6  F (37  C) (Temporal)   Ht 1.695 m (5' 6.75\")   Wt 73.5 kg (162 lb)   SpO2 98%   BMI 25.56 kg/m    Body mass index is 25.56 kg/m .     Physical Exam   GENERAL: healthy, alert and no distress  MS: LE normal range of motion, no cyanosis, clubbing, or edema, no edema, no tenderness to palpation  and no laxity of either knee.   SKIN: no suspicious lesions or rashes    Diagnostic Test Results:  none         Assessment & Plan     1. Acute pain of bilateral knees  - Orthopedic & Spine  Referral; Future  - XR Knee Bilateral 1/2 Views; Future  - naproxen (NAPROSYN) 375 MG tablet; Take 1 tablet (375 mg) by mouth 2 times daily (with meals)  Dispense: 30 tablet; Refill: 1     BMI:   Estimated body mass index is 25.56 kg/m  as calculated from the following:    Height as of this encounter: 1.695 m (5' 6.75\").    Weight as of this encounter: 73.5 kg (162 lb).   Weight management plan: Patient was referred to their PCP to discuss a diet and exercise plan.            Return per Ortho recommendations.   Patient amenable to this follow up plan.     Harish Shields, " NAHUM  [facility] G[facility]

## 2020-09-09 DIAGNOSIS — N95.1 SYMPTOMATIC MENOPAUSAL OR FEMALE CLIMACTERIC STATES: ICD-10-CM

## 2020-09-09 LAB
ESTRADIOL SERPL-MCNC: 57 PG/ML
FSH SERPL-ACNC: 8.6 IU/L

## 2020-09-09 PROCEDURE — 82670 ASSAY OF TOTAL ESTRADIOL: CPT | Performed by: NURSE PRACTITIONER

## 2020-09-09 PROCEDURE — 83001 ASSAY OF GONADOTROPIN (FSH): CPT | Performed by: NURSE PRACTITIONER

## 2020-09-09 PROCEDURE — 36415 COLL VENOUS BLD VENIPUNCTURE: CPT | Performed by: NURSE PRACTITIONER

## 2020-09-09 PROCEDURE — 84403 ASSAY OF TOTAL TESTOSTERONE: CPT | Performed by: NURSE PRACTITIONER

## 2020-09-11 LAB — TESTOST SERPL-MCNC: 107 NG/DL (ref 8–60)

## 2020-09-13 ENCOUNTER — MYC MEDICAL ADVICE (OUTPATIENT)
Dept: FAMILY MEDICINE | Facility: CLINIC | Age: 45
End: 2020-09-13

## 2020-09-15 DIAGNOSIS — N95.1 FEMALE CLIMACTERIC STATE: Primary | ICD-10-CM

## 2020-09-16 ENCOUNTER — OFFICE VISIT (OUTPATIENT)
Dept: ORTHOPEDICS | Facility: CLINIC | Age: 45
End: 2020-09-16
Payer: COMMERCIAL

## 2020-09-16 VITALS
DIASTOLIC BLOOD PRESSURE: 75 MMHG | WEIGHT: 160.8 LBS | HEIGHT: 67 IN | OXYGEN SATURATION: 98 % | BODY MASS INDEX: 25.24 KG/M2 | HEART RATE: 80 BPM | SYSTOLIC BLOOD PRESSURE: 120 MMHG

## 2020-09-16 DIAGNOSIS — S83.411A SPRAIN OF MEDIAL COLLATERAL LIGAMENT OF RIGHT KNEE, INITIAL ENCOUNTER: Primary | ICD-10-CM

## 2020-09-16 PROCEDURE — 99243 OFF/OP CNSLTJ NEW/EST LOW 30: CPT | Performed by: ORTHOPAEDIC SURGERY

## 2020-09-16 ASSESSMENT — MIFFLIN-ST. JEOR: SCORE: 1408.04

## 2020-09-16 ASSESSMENT — PAIN SCALES - GENERAL: PAINLEVEL: MODERATE PAIN (4)

## 2020-09-16 NOTE — LETTER
"    9/16/2020         RE: Dasia Reddy  [address] [place] [facility] [address]        Dear Colleague,    Thank you for referring your patient, Dasia Reddy, to the [facility]. Please see a copy of my visit note below.    CHIEF COMPLAINT:   Chief Complaint   Patient presents with     Right Knee - Pain     Onset: 7/8/20. Patient notes she was surfing and the board squirreled out from underneath her when she was trying to get up. She felt and heard a pop along with immediate pain. She had some swelling. Pain is medial. She was seen      Knee Pain     at PT and was given exercises. The therapist thought it might be an MCL tear.    .  Dasia Reddy is seen today in the [facility] for evaluation of bilateral knee pain at the request of Harish Shields PA-C      HISTORY OF PRESENT ILLNESS    Dasia Reddy is a 45 year old female seen for evaluation of ongoing right knee pain with a known injury.   Pain has been present for 2 months. Reports on 7//2020 was surfing behind a boat and the board got away from underneath her when trying to get up, she felt and heard a \"pop\" along the inner aspect of the knee with immediate pain. She had some swelling, \"not a ton\". Locates pain along the inner aspect of the knee (points medially). She was seen in Physical Therapy and given exercises, thinking it might be an medial collateral ligament tear. Doesn't affect her walking, doesn't limp. She's done some ice, ibuprofen and soft \"copper fit\" type sleeve. She did injure her left knee about 2 weeks prior when the board hit the front of her knee/leg, so was favoring the left knee and thinks maybe the right leg was fatigued.    Denies locking, catching, giving way.    Present symptoms: mild pain.    Pain severity: 4/10  Frequency of symptoms: frequently      Other PMH:  has a past medical history of Ovarian torsion.  Patient Active Problem List   Diagnosis     INJURY-RT FOOT     Hyperhidrosis " "    CARDIOVASCULAR SCREENING; LDL GOAL LESS THAN 160     Family history of pulmonary fibrosis       Surgical Hx:  has a past surgical history that includes surgical history of -  and surgical history of -  (2009).    Medications:   Current Outpatient Medications:      MIRENA 20 MCG/24HR IU IUD, , Disp: , Rfl: 0     MULTI-VITAMIN OR TABS, 1 TABLET DAILY, Disp: 30, Rfl: 0     naproxen (NAPROSYN) 375 MG tablet, Take 1 tablet (375 mg) by mouth 2 times daily (with meals), Disp: 30 tablet, Rfl: 1     phenazopyridine (PYRIDIUM) 100 MG tablet, Take 1 tablet (100 mg) by mouth 3 times daily as needed for urinary tract discomfort, Disp: 6 tablet, Rfl: 0     zolpidem (AMBIEN) 10 MG tablet, 1/2 tab as needed for travel related sleep issues, Disp: 15 tablet, Rfl: 3    Allergies:   Allergies   Allergen Reactions     Codecon-C [Codeine] Nausea and Vomiting       Social Hx:    reports that she quit smoking about 20 years ago. She has never used smokeless tobacco. She reports current alcohol use. She reports that she does not use drugs.    Family Hx: family history includes Diabetes in an other family member; Hypertension in her father; Lung Transplant in her father.    REVIEW OF SYSTEMS: 10 point ROS neg other than the symptoms noted above in the HPI and PMH. Notables include  CONSTITUTIONAL:NEGATIVE for fever, chills, change in weight  INTEGUMENTARY/SKIN: NEGATIVE for worrisome rashes, moles or lesions  MUSCULOSKELETAL:See HPI above  NEURO: NEGATIVE for weakness, dizziness or paresthesias    PHYSICAL EXAM:  /75   Pulse 80   Ht 1.695 m (5' 6.75\")   Wt 72.9 kg (160 lb 12.8 oz)   SpO2 98%   BMI 25.37 kg/m     GENERAL APPEARANCE: healthy, alert, no distress  SKIN: no suspicious lesions or rashes  NEURO: Normal strength and tone, mentation intact and speech normal  PSYCH:  mentation appears normal and affect normal, not anxious  RESPIRATORY: No increased work of breathing.  HANDS: no clubbing, nail pitting  LYMPH: no palpable " popliteal lymphadenopathy.    BILATERAL LOWER EXTREMITIES:  Gait: normal  Alignment: varus  No gross deformities or masses.  No Quad atrophy, strength normal.  Intact sensation deep peroneal nerve, superficial peroneal nerve, med/lat tibial nerve, sural nerve, saphenous nerve  Intact EHL, EDL, TA, FHL, GS, quadriceps hamstrings and hip flexors  Toes warm and well perfused, brisk capillary refill. Palpable 2+ dp pulses.  Bilateral calf soft and nttp or squeeze.  DTRs: achilles 2+, patella 2+.  Edema: none    LEFT KNEE EXAM:    Skin: intact, no ecchymosis or erythema  Squat: 100 %, not limited by pain.     ROM: full extension to 140 flexion  Tight hamstrings on straight leg raise.  Effusion: none  Tender: NTTP med/lat joint line, anterior or posterior knee  McMurrays: negative    MCL: stable, and non-painful at both 0 and 30 degrees knee flexion  Varus stress: stable, and non-painful at both 0 and 30 degrees knee flexion  Lachmans: neg, firm endpoint  Posterior Drawer stable  Patellofemoral joint:                Apprehension: negative              Crepitations: minimal    RIGHT KNEE EXAM:    Skin: intact, no ecchymosis or erythema  Squat: 100 %, not limited by pain.     ROM: full extension to 140 flexion  Tight hamstrings on straight leg raise.  Effusion: none  Tender: medial collateral ligament, medial epicondyle; slight tender to palpation medial joint line  NTTP lat joint line, anterior or posterior knee  McMurrays: negative    MCL: stable, and mildly-painful at both 0 and 30 degrees knee flexion  Varus stress: stable, and non-painful at both 0 and 30 degrees knee flexion  Lachmans: neg, firm endpoint  Posterior Drawer stable  Patellofemoral joint:                Apprehension: negative              Crepitations: minimal   Grind: mild positive.    X-RAY:  2 views bilateral knee from 7/16/2020 were reviewed in clinic today. On my review, no obvious fractures or dislocations. Preserved joint spaces.        ASSESSMENT/PLAN: Dasia Reddy is a 45 year old female with acute right knee pain, low grade medial collateral ligament sprain.     * images, exam findings reviewed. Consistent with low grade medial collateral ligament sprain  * cannot rule out medial meniscus tear, but based on history, exam, more consistent with medial collateral ligament  * anterior cruciate ligament feels intact, as well as the rest of the knee  * treatment is nonsurgical with protected weight bearing, hinged brace to provide varus/valgus stability, rest, ice, over the counter pain control, activity modification, Physical Therapy and home exercise program  * if symptoms don't improve, then advised patient to call and we can order an MRI      Terrance Ocasio M.D., M.S.  Dept. of Orthopaedic Surgery  [facility]        Again, thank you for allowing me to participate in the care of your patient.        Sincerely,        Terrance Ocasio MD

## 2020-09-16 NOTE — PROGRESS NOTES
"CHIEF COMPLAINT:   Chief Complaint   Patient presents with     Right Knee - Pain     Onset: 7/8/20. Patient notes she was surfing and the board squirreled out from underneath her when she was trying to get up. She felt and heard a pop along with immediate pain. She had some swelling. Pain is medial. She was seen      Knee Pain     at PT and was given exercises. The therapist thought it might be an MCL tear.    .  Dasia Reddy is seen today in the [facility] for evaluation of bilateral knee pain at the request of Harish Shields PA-C      HISTORY OF PRESENT ILLNESS    Dasia Reddy is a 45 year old female seen for evaluation of ongoing right knee pain with a known injury.   Pain has been present for 2 months. Reports on 7//2020 was surfing behind a boat and the board got away from underneath her when trying to get up, she felt and heard a \"pop\" along the inner aspect of the knee with immediate pain. She had some swelling, \"not a ton\". Locates pain along the inner aspect of the knee (points medially). She was seen in Physical Therapy and given exercises, thinking it might be an medial collateral ligament tear. Doesn't affect her walking, doesn't limp. She's done some ice, ibuprofen and soft \"copper fit\" type sleeve. She did injure her left knee about 2 weeks prior when the board hit the front of her knee/leg, so was favoring the left knee and thinks maybe the right leg was fatigued.    Denies locking, catching, giving way.    Present symptoms: mild pain.    Pain severity: 4/10  Frequency of symptoms: frequently      Other PMH:  has a past medical history of Ovarian torsion.  Patient Active Problem List   Diagnosis     INJURY-RT FOOT     Hyperhidrosis     CARDIOVASCULAR SCREENING; LDL GOAL LESS THAN 160     Family history of pulmonary fibrosis       Surgical Hx:  has a past surgical history that includes surgical history of -  and surgical history of -  (2009).    Medications:   Current " "Outpatient Medications:      MIRENA 20 MCG/24HR IU IUD, , Disp: , Rfl: 0     MULTI-VITAMIN OR TABS, 1 TABLET DAILY, Disp: 30, Rfl: 0     naproxen (NAPROSYN) 375 MG tablet, Take 1 tablet (375 mg) by mouth 2 times daily (with meals), Disp: 30 tablet, Rfl: 1     phenazopyridine (PYRIDIUM) 100 MG tablet, Take 1 tablet (100 mg) by mouth 3 times daily as needed for urinary tract discomfort, Disp: 6 tablet, Rfl: 0     zolpidem (AMBIEN) 10 MG tablet, 1/2 tab as needed for travel related sleep issues, Disp: 15 tablet, Rfl: 3    Allergies:   Allergies   Allergen Reactions     Codecon-C [Codeine] Nausea and Vomiting       Social Hx:    reports that she quit smoking about 20 years ago. She has never used smokeless tobacco. She reports current alcohol use. She reports that she does not use drugs.    Family Hx: family history includes Diabetes in an other family member; Hypertension in her father; Lung Transplant in her father.    REVIEW OF SYSTEMS: 10 point ROS neg other than the symptoms noted above in the HPI and PMH. Notables include  CONSTITUTIONAL:NEGATIVE for fever, chills, change in weight  INTEGUMENTARY/SKIN: NEGATIVE for worrisome rashes, moles or lesions  MUSCULOSKELETAL:See HPI above  NEURO: NEGATIVE for weakness, dizziness or paresthesias    PHYSICAL EXAM:  /75   Pulse 80   Ht 1.695 m (5' 6.75\")   Wt 72.9 kg (160 lb 12.8 oz)   SpO2 98%   BMI 25.37 kg/m     GENERAL APPEARANCE: healthy, alert, no distress  SKIN: no suspicious lesions or rashes  NEURO: Normal strength and tone, mentation intact and speech normal  PSYCH:  mentation appears normal and affect normal, not anxious  RESPIRATORY: No increased work of breathing.  HANDS: no clubbing, nail pitting  LYMPH: no palpable popliteal lymphadenopathy.    BILATERAL LOWER EXTREMITIES:  Gait: normal  Alignment: varus  No gross deformities or masses.  No Quad atrophy, strength normal.  Intact sensation deep peroneal nerve, superficial peroneal nerve, med/lat " tibial nerve, sural nerve, saphenous nerve  Intact EHL, EDL, TA, FHL, GS, quadriceps hamstrings and hip flexors  Toes warm and well perfused, brisk capillary refill. Palpable 2+ dp pulses.  Bilateral calf soft and nttp or squeeze.  DTRs: achilles 2+, patella 2+.  Edema: none    LEFT KNEE EXAM:    Skin: intact, no ecchymosis or erythema  Squat: 100 %, not limited by pain.     ROM: full extension to 140 flexion  Tight hamstrings on straight leg raise.  Effusion: none  Tender: NTTP med/lat joint line, anterior or posterior knee  McMurrays: negative    MCL: stable, and non-painful at both 0 and 30 degrees knee flexion  Varus stress: stable, and non-painful at both 0 and 30 degrees knee flexion  Lachmans: neg, firm endpoint  Posterior Drawer stable  Patellofemoral joint:                Apprehension: negative              Crepitations: minimal    RIGHT KNEE EXAM:    Skin: intact, no ecchymosis or erythema  Squat: 100 %, not limited by pain.     ROM: full extension to 140 flexion  Tight hamstrings on straight leg raise.  Effusion: none  Tender: medial collateral ligament, medial epicondyle; slight tender to palpation medial joint line  NTTP lat joint line, anterior or posterior knee  McMurrays: negative    MCL: stable, and mildly-painful at both 0 and 30 degrees knee flexion  Varus stress: stable, and non-painful at both 0 and 30 degrees knee flexion  Lachmans: neg, firm endpoint  Posterior Drawer stable  Patellofemoral joint:                Apprehension: negative              Crepitations: minimal   Grind: mild positive.    X-RAY:  2 views bilateral knee from 7/16/2020 were reviewed in clinic today. On my review, no obvious fractures or dislocations. Preserved joint spaces.       ASSESSMENT/PLAN: Dasia Reddy is a 45 year old female with acute right knee pain, low grade medial collateral ligament sprain.     * images, exam findings reviewed. Consistent with low grade medial collateral ligament sprain  * cannot rule out  medial meniscus tear, but based on history, exam, more consistent with medial collateral ligament  * anterior cruciate ligament feels intact, as well as the rest of the knee  * treatment is nonsurgical with protected weight bearing, hinged brace to provide varus/valgus stability, rest, ice, over the counter pain control, activity modification, Physical Therapy and home exercise program  * if symptoms don't improve, then advised patient to call and we can order an MRI      Terrance Ocasio M.D., M.S.  Dept. of Orthopaedic Surgery  [facility]

## 2020-11-16 ENCOUNTER — HEALTH MAINTENANCE LETTER (OUTPATIENT)
Age: 45
End: 2020-11-16

## 2020-12-21 DIAGNOSIS — N95.1 FEMALE CLIMACTERIC STATE: ICD-10-CM

## 2020-12-21 LAB — HCG SERPL QL: NEGATIVE

## 2020-12-21 PROCEDURE — 83001 ASSAY OF GONADOTROPIN (FSH): CPT | Performed by: NURSE PRACTITIONER

## 2020-12-21 PROCEDURE — 99000 SPECIMEN HANDLING OFFICE-LAB: CPT | Performed by: NURSE PRACTITIONER

## 2020-12-21 PROCEDURE — 84703 CHORIONIC GONADOTROPIN ASSAY: CPT | Performed by: NURSE PRACTITIONER

## 2020-12-21 PROCEDURE — 36415 COLL VENOUS BLD VENIPUNCTURE: CPT | Performed by: NURSE PRACTITIONER

## 2020-12-21 PROCEDURE — 84403 ASSAY OF TOTAL TESTOSTERONE: CPT | Mod: 90 | Performed by: NURSE PRACTITIONER

## 2020-12-21 PROCEDURE — 84270 ASSAY OF SEX HORMONE GLOBUL: CPT | Performed by: NURSE PRACTITIONER

## 2020-12-21 PROCEDURE — 82670 ASSAY OF TOTAL ESTRADIOL: CPT | Performed by: NURSE PRACTITIONER

## 2020-12-22 LAB
ESTRADIOL SERPL-MCNC: 70 PG/ML
FSH SERPL-ACNC: 2.7 IU/L

## 2020-12-23 LAB
SHBG SERPL-SCNC: 53 NMOL/L (ref 30–135)
TESTOST FREE SERPL-MCNC: 0.87 NG/DL (ref 0.11–0.58)
TESTOST SERPL-MCNC: 66 NG/DL (ref 8–60)

## 2021-01-04 ENCOUNTER — MYC MEDICAL ADVICE (OUTPATIENT)
Dept: FAMILY MEDICINE | Facility: CLINIC | Age: 46
End: 2021-01-04

## 2021-02-11 DIAGNOSIS — N95.1 FEMALE CLIMACTERIC STATE: ICD-10-CM

## 2021-02-11 LAB
ESTRADIOL SERPL-MCNC: 240 PG/ML
FSH SERPL-ACNC: 3.2 IU/L
HCG SERPL QL: NEGATIVE

## 2021-02-11 PROCEDURE — 99000 SPECIMEN HANDLING OFFICE-LAB: CPT | Performed by: NURSE PRACTITIONER

## 2021-02-11 PROCEDURE — 82670 ASSAY OF TOTAL ESTRADIOL: CPT | Performed by: NURSE PRACTITIONER

## 2021-02-11 PROCEDURE — 84403 ASSAY OF TOTAL TESTOSTERONE: CPT | Mod: 90 | Performed by: NURSE PRACTITIONER

## 2021-02-11 PROCEDURE — 36415 COLL VENOUS BLD VENIPUNCTURE: CPT | Performed by: NURSE PRACTITIONER

## 2021-02-11 PROCEDURE — 84270 ASSAY OF SEX HORMONE GLOBUL: CPT | Performed by: NURSE PRACTITIONER

## 2021-02-11 PROCEDURE — 83001 ASSAY OF GONADOTROPIN (FSH): CPT | Performed by: NURSE PRACTITIONER

## 2021-02-11 PROCEDURE — 84703 CHORIONIC GONADOTROPIN ASSAY: CPT | Performed by: NURSE PRACTITIONER

## 2021-02-16 LAB
SHBG SERPL-SCNC: 41 NMOL/L (ref 30–135)
TESTOST FREE SERPL-MCNC: 1.63 NG/DL (ref 0.11–0.58)
TESTOST SERPL-MCNC: 102 NG/DL (ref 8–60)

## 2021-04-03 ENCOUNTER — HEALTH MAINTENANCE LETTER (OUTPATIENT)
Age: 46
End: 2021-04-03

## 2021-05-21 ENCOUNTER — TELEPHONE (OUTPATIENT)
Dept: FAMILY MEDICINE | Facility: CLINIC | Age: 46
End: 2021-05-21

## 2021-05-21 DIAGNOSIS — Z71.84 COUNSELING ABOUT TRAVEL: ICD-10-CM

## 2021-05-21 RX ORDER — ZOLPIDEM TARTRATE 10 MG/1
TABLET ORAL
Qty: 15 TABLET | Refills: 3 | OUTPATIENT
Start: 2021-05-21

## 2021-05-21 NOTE — TELEPHONE ENCOUNTER
Biju Silva NP has not seen pt since 2018. Last time saw by any provider at our clinic was 2019. Pt will need an annal visit for refills.  Rx denied. Pharmacy notified.    Valentina Bartlett RN

## 2021-05-21 NOTE — TELEPHONE ENCOUNTER
[facility] phone call message- patient requests medication or medication refill:    If this is a refill request, has the caller requested the refill from the pharmacy already? No  Name of the pharmacy and phone number for the current request:  [facility] [place]    Name of the medication requested: Ambien    Other request:     OK to leave the result message on voice mail or with a family member? NO    par Call taken on 5/21/2021 at 3:10 PM by Shannan Kang

## 2021-05-26 ENCOUNTER — RECORDS - HEALTHEAST (OUTPATIENT)
Dept: ADMINISTRATIVE | Facility: CLINIC | Age: 46
End: 2021-05-26

## 2021-06-02 ENCOUNTER — RECORDS - HEALTHEAST (OUTPATIENT)
Dept: ADMINISTRATIVE | Facility: CLINIC | Age: 46
End: 2021-06-02

## 2021-06-10 DIAGNOSIS — N95.1 FEMALE CLIMACTERIC STATE: ICD-10-CM

## 2021-06-10 LAB — HCG SERPL QL: NEGATIVE

## 2021-06-10 PROCEDURE — 84403 ASSAY OF TOTAL TESTOSTERONE: CPT | Mod: 90 | Performed by: NURSE PRACTITIONER

## 2021-06-10 PROCEDURE — 84703 CHORIONIC GONADOTROPIN ASSAY: CPT | Performed by: NURSE PRACTITIONER

## 2021-06-10 PROCEDURE — 82670 ASSAY OF TOTAL ESTRADIOL: CPT | Performed by: NURSE PRACTITIONER

## 2021-06-10 PROCEDURE — 84270 ASSAY OF SEX HORMONE GLOBUL: CPT | Performed by: NURSE PRACTITIONER

## 2021-06-10 PROCEDURE — 36415 COLL VENOUS BLD VENIPUNCTURE: CPT | Performed by: NURSE PRACTITIONER

## 2021-06-10 PROCEDURE — 99000 SPECIMEN HANDLING OFFICE-LAB: CPT | Performed by: NURSE PRACTITIONER

## 2021-06-10 PROCEDURE — 83001 ASSAY OF GONADOTROPIN (FSH): CPT | Performed by: NURSE PRACTITIONER

## 2021-06-11 LAB
ESTRADIOL SERPL-MCNC: 78 PG/ML
FSH SERPL-ACNC: 1.9 IU/L

## 2021-06-15 LAB
SHBG SERPL-SCNC: 66 NMOL/L (ref 30–135)
TESTOST FREE SERPL-MCNC: 1.12 NG/DL (ref 0.11–0.58)
TESTOST SERPL-MCNC: 99 NG/DL (ref 8–60)

## 2021-07-29 ENCOUNTER — OFFICE VISIT (OUTPATIENT)
Dept: URGENT CARE | Facility: URGENT CARE | Age: 46
End: 2021-07-29
Payer: COMMERCIAL

## 2021-07-29 VITALS
SYSTOLIC BLOOD PRESSURE: 118 MMHG | WEIGHT: 157 LBS | HEART RATE: 74 BPM | BODY MASS INDEX: 24.77 KG/M2 | OXYGEN SATURATION: 98 % | DIASTOLIC BLOOD PRESSURE: 72 MMHG | TEMPERATURE: 98.4 F

## 2021-07-29 DIAGNOSIS — R30.0 DYSURIA: ICD-10-CM

## 2021-07-29 DIAGNOSIS — N30.00 ACUTE CYSTITIS WITHOUT HEMATURIA: Primary | ICD-10-CM

## 2021-07-29 LAB
ALBUMIN UR-MCNC: NEGATIVE MG/DL
APPEARANCE UR: ABNORMAL
BACTERIA #/AREA URNS HPF: ABNORMAL /HPF
BILIRUB UR QL STRIP: NEGATIVE
COLOR UR AUTO: YELLOW
GLUCOSE UR STRIP-MCNC: NEGATIVE MG/DL
HGB UR QL STRIP: NEGATIVE
KETONES UR STRIP-MCNC: NEGATIVE MG/DL
LEUKOCYTE ESTERASE UR QL STRIP: ABNORMAL
NITRATE UR QL: POSITIVE
PH UR STRIP: 7 [PH] (ref 5–7)
RBC #/AREA URNS AUTO: ABNORMAL /HPF
SP GR UR STRIP: 1.01 (ref 1–1.03)
SQUAMOUS #/AREA URNS AUTO: ABNORMAL /LPF
UROBILINOGEN UR STRIP-ACNC: 0.2 E.U./DL
WBC #/AREA URNS AUTO: ABNORMAL /HPF

## 2021-07-29 PROCEDURE — 99213 OFFICE O/P EST LOW 20 MIN: CPT | Performed by: PHYSICIAN ASSISTANT

## 2021-07-29 PROCEDURE — 87086 URINE CULTURE/COLONY COUNT: CPT | Performed by: PHYSICIAN ASSISTANT

## 2021-07-29 PROCEDURE — 81001 URINALYSIS AUTO W/SCOPE: CPT | Performed by: PHYSICIAN ASSISTANT

## 2021-07-29 RX ORDER — NITROFURANTOIN 25; 75 MG/1; MG/1
100 CAPSULE ORAL 2 TIMES DAILY
Qty: 14 CAPSULE | Refills: 0 | Status: SHIPPED | OUTPATIENT
Start: 2021-07-29 | End: 2021-08-05

## 2021-07-29 ASSESSMENT — ENCOUNTER SYMPTOMS
HEADACHES: 0
POLYDIPSIA: 0
FLANK PAIN: 0
DIARRHEA: 0
DYSURIA: 1
COUGH: 0
ACTIVITY CHANGE: 0
ENDOCRINE NEGATIVE: 1
DIZZINESS: 0
MYALGIAS: 0
SHORTNESS OF BREATH: 0
ADENOPATHY: 0
GASTROINTESTINAL NEGATIVE: 1
NEUROLOGICAL NEGATIVE: 1
ABDOMINAL PAIN: 0
CHILLS: 0
RESPIRATORY NEGATIVE: 1
FATIGUE: 0
NAUSEA: 0
NECK PAIN: 0
RHINORRHEA: 0
LIGHT-HEADEDNESS: 0
NECK STIFFNESS: 0
SORE THROAT: 0
WEAKNESS: 0
VOMITING: 0
FREQUENCY: 1
CARDIOVASCULAR NEGATIVE: 1
FEVER: 0
PALPITATIONS: 0
HEMATURIA: 0
CONSTITUTIONAL NEGATIVE: 1
MUSCULOSKELETAL NEGATIVE: 1

## 2021-07-29 NOTE — PROGRESS NOTES
Chief Complaint:    Chief Complaint   Patient presents with     Urinary Problem         ASSESSMENT     1. Acute cystitis without hematuria    2. Dysuria         PLAN    Urinalysis discussed with patient  We will call with culture results if resistant.  Rx for Macrobid today  Treatment currentguidelines - also push fluids, may use Pyridium OTC prn.   Follow up with PCP in 2-3 days if symptoms are not improving.  Worrisome symptoms discussed with instructions to go to the ED.  Patient verbalized understanding and agreed with this plan.    Labs:     Results for orders placed or performed in visit on 07/29/21   UA Macro with Reflex to Micro and Culture - lab collect     Status: Abnormal    Specimen: Urine, Clean Catch   Result Value Ref Range    Color Urine Yellow Colorless, Straw, Light Yellow, Yellow    Appearance Urine Cloudy (A) Clear    Glucose Urine Negative Negative mg/dL    Bilirubin Urine Negative Negative    Ketones Urine Negative Negative mg/dL    Specific Gravity Urine 1.010 1.003 - 1.035    Blood Urine Negative Negative    pH Urine 7.0 5.0 - 7.0    Protein Albumin Urine Negative Negative mg/dL    Urobilinogen Urine 0.2 0.2, 1.0 E.U./dL    Nitrite Urine Positive (A) Negative    Leukocyte Esterase Urine Moderate (A) Negative   Urine Microscopic Exam     Status: Abnormal   Result Value Ref Range    Bacteria Urine Moderate (A) None Seen /HPF    RBC Urine 2-5 (A) 0-2 /HPF /HPF    WBC Urine 10-25 (A) 0-5 /HPF /HPF    Squamous Epithelials Urine Moderate (A) None Seen /LPF       Problem history    Patient Active Problem List   Diagnosis     INJURY-RT FOOT     Hyperhidrosis     CARDIOVASCULAR SCREENING; LDL GOAL LESS THAN 160     Family history of pulmonary fibrosis       Current Meds    Current Outpatient Medications:      nitroFURantoin macrocrystal-monohydrate (MACROBID) 100 MG capsule, Take 1 capsule (100 mg) by mouth 2 times daily for 7 days, Disp: 14 capsule, Rfl: 0     MIRENA 20 MCG/24HR IU IUD, , Disp: ,  Rfl: 0     MULTI-VITAMIN OR TABS, 1 TABLET DAILY, Disp: 30, Rfl: 0     zolpidem (AMBIEN) 10 MG tablet, 1/2 tab as needed for travel related sleep issues, Disp: 15 tablet, Rfl: 3    Allergies  Allergies   Allergen Reactions     Codecon-C [Codeine] Nausea and Vomiting       SUBJECTIVE    HPI:  Dasia Reddy is a 46 year old female who has symptoms of urinary dysuria for 1 day(s).  she denies back pain, nausea, vomiting, fever and chills, flank pain, vaginal discharge, and vaginal odor.    ROS:      Review of Systems   Constitutional: Negative.  Negative for activity change, chills, fatigue and fever.   HENT: Negative for congestion, ear pain, rhinorrhea and sore throat.    Respiratory: Negative.  Negative for cough and shortness of breath.    Cardiovascular: Negative.  Negative for chest pain and palpitations.   Gastrointestinal: Negative.  Negative for abdominal pain, diarrhea, nausea and vomiting.   Endocrine: Negative.  Negative for polydipsia and polyuria.   Genitourinary: Positive for dysuria, frequency and urgency. Negative for flank pain, hematuria, pelvic pain, vaginal discharge and vaginal pain.   Musculoskeletal: Negative.  Negative for myalgias, neck pain and neck stiffness.   Allergic/Immunologic: Negative for immunocompromised state.   Neurological: Negative.  Negative for dizziness, weakness, light-headedness and headaches.   Hematological: Negative for adenopathy.       Family History   Family History   Problem Relation Age of Onset     Hypertension Father      Lung Transplant Father      Diabetes Other      C.A.D. No family hx of      Cerebrovascular Disease No family hx of      Breast Cancer No family hx of      Cancer - colorectal No family hx of      Prostate Cancer No family hx of         Social History  Social History     Socioeconomic History     Marital status:      Spouse name: Not on file     Number of children: 2     Years of education: Not on file     Highest education level: Not on  file   Occupational History     Employer: [employer]   Tobacco Use     Smoking status: Former Smoker     Quit date: 2000     Years since quittin.9     Smokeless tobacco: Never Used   Substance and Sexual Activity     Alcohol use: Yes     Comment: 5 drinks/week     Drug use: No     Sexual activity: Yes     Partners: Male     Birth control/protection: I.U.D.     Comment: mirena   Other Topics Concern     Parent/sibling w/ CABG, MI or angioplasty before 65F 55M? No   Social History Narrative     Not on file     Social Determinants of Health     Financial Resource Strain:      Difficulty of Paying Living Expenses:    Food Insecurity:      Worried About Running Out of Food in the Last Year:      Ran Out of Food in the Last Year:    Transportation Needs:      Lack of Transportation (Medical):      Lack of Transportation (Non-Medical):    Physical Activity:      Days of Exercise per Week:      Minutes of Exercise per Session:    Stress:      Feeling of Stress :    Social Connections:      Frequency of Communication with Friends and Family:      Frequency of Social Gatherings with Friends and Family:      Attends Congregation Services:      Active Member of Clubs or Organizations:      Attends Club or Organization Meetings:      Marital Status:    Intimate Partner Violence:      Fear of Current or Ex-Partner:      Emotionally Abused:      Physically Abused:      Sexually Abused:            OBJECTIVE     Vital signs noted and reviewed by Juvenal Haile PA-C  /72   Pulse 74   Temp 98.4  F (36.9  C) (Oral)   Wt 71.2 kg (157 lb)   SpO2 98%   BMI 24.77 kg/m       Physical Exam  Vitals and nursing note reviewed.   Constitutional:       General: She is not in acute distress.     Appearance: Normal appearance. She is well-developed. She is not ill-appearing, toxic-appearing or diaphoretic.   HENT:      Head: Normocephalic and atraumatic.      Right Ear: Tympanic membrane and external ear normal.      Left Ear:  Tympanic membrane and external ear normal.   Eyes:      Pupils: Pupils are equal, round, and reactive to light.   Cardiovascular:      Rate and Rhythm: Normal rate and regular rhythm.      Heart sounds: Normal heart sounds. No murmur heard.   No friction rub. No gallop.    Pulmonary:      Effort: Pulmonary effort is normal. No respiratory distress.      Breath sounds: Normal breath sounds. No wheezing or rales.   Chest:      Chest wall: No tenderness.   Abdominal:      General: Bowel sounds are normal. There is no distension.      Palpations: Abdomen is soft. Abdomen is not rigid. There is no mass.      Tenderness: There is no abdominal tenderness. There is no guarding or rebound. Negative signs include Brumfield's sign and McBurney's sign.   Musculoskeletal:      Cervical back: Normal range of motion and neck supple.   Lymphadenopathy:      Cervical: No cervical adenopathy.   Skin:     General: Skin is warm and dry.   Neurological:      Mental Status: She is alert and oriented to person, place, and time.      Cranial Nerves: No cranial nerve deficit.      Deep Tendon Reflexes: Reflexes are normal and symmetric.   Psychiatric:         Behavior: Behavior normal. Behavior is cooperative.         Thought Content: Thought content normal.         Judgment: Judgment normal.             Juvenal Haile PA-C  7/29/2021, 11:22 AM

## 2021-07-30 LAB — BACTERIA UR CULT: NORMAL

## 2021-09-18 ENCOUNTER — HEALTH MAINTENANCE LETTER (OUTPATIENT)
Age: 46
End: 2021-09-18

## 2022-01-08 ENCOUNTER — HEALTH MAINTENANCE LETTER (OUTPATIENT)
Age: 47
End: 2022-01-08

## 2022-03-03 ENCOUNTER — TRANSFERRED RECORDS (OUTPATIENT)
Dept: HEALTH INFORMATION MANAGEMENT | Facility: CLINIC | Age: 47
End: 2022-03-03
Payer: COMMERCIAL

## 2022-03-03 LAB
CHOLESTEROL (EXTERNAL): 230 MG/DL (ref 100–199)
HDLC SERPL-MCNC: 90 MG/DL
HPV ABSTRACT: NORMAL
LDL CHOLESTEROL (EXTERNAL): 125 MG/DL (ref 0–99)
PAP-ABSTRACT: NORMAL
TRIGLYCERIDES (EXTERNAL): 90 MG/DL (ref 0–149)

## 2022-03-30 ENCOUNTER — ANCILLARY PROCEDURE (OUTPATIENT)
Dept: MAMMOGRAPHY | Facility: CLINIC | Age: 47
End: 2022-03-30
Attending: OBSTETRICS & GYNECOLOGY
Payer: COMMERCIAL

## 2022-03-30 DIAGNOSIS — Z12.31 VISIT FOR SCREENING MAMMOGRAM: ICD-10-CM

## 2022-03-30 PROCEDURE — 77067 SCR MAMMO BI INCL CAD: CPT | Mod: TC | Performed by: RADIOLOGY

## 2022-04-24 ENCOUNTER — HEALTH MAINTENANCE LETTER (OUTPATIENT)
Age: 47
End: 2022-04-24

## 2022-05-04 ENCOUNTER — OFFICE VISIT (OUTPATIENT)
Dept: FAMILY MEDICINE | Facility: CLINIC | Age: 47
End: 2022-05-04
Payer: COMMERCIAL

## 2022-05-04 VITALS
RESPIRATION RATE: 10 BRPM | BODY MASS INDEX: 26.39 KG/M2 | HEIGHT: 66 IN | OXYGEN SATURATION: 97 % | HEART RATE: 76 BPM | SYSTOLIC BLOOD PRESSURE: 96 MMHG | DIASTOLIC BLOOD PRESSURE: 68 MMHG | WEIGHT: 164.2 LBS | TEMPERATURE: 97.3 F

## 2022-05-04 DIAGNOSIS — Z00.00 ROUTINE HISTORY AND PHYSICAL EXAMINATION OF ADULT: Primary | ICD-10-CM

## 2022-05-04 DIAGNOSIS — E66.3 OVERWEIGHT: ICD-10-CM

## 2022-05-04 DIAGNOSIS — Z83.3 FAMILY HISTORY OF DIABETES MELLITUS: ICD-10-CM

## 2022-05-04 DIAGNOSIS — Z11.4 SCREENING FOR HIV (HUMAN IMMUNODEFICIENCY VIRUS): ICD-10-CM

## 2022-05-04 DIAGNOSIS — E78.5 HYPERLIPIDEMIA LDL GOAL <100: ICD-10-CM

## 2022-05-04 DIAGNOSIS — Z23 NEED FOR PROPHYLACTIC VACCINATION AGAINST DIPHTHERIA AND TETANUS: ICD-10-CM

## 2022-05-04 DIAGNOSIS — Z11.59 NEED FOR HEPATITIS C SCREENING TEST: ICD-10-CM

## 2022-05-04 DIAGNOSIS — Z13.1 SCREENING FOR DIABETES MELLITUS: ICD-10-CM

## 2022-05-04 PROCEDURE — 87389 HIV-1 AG W/HIV-1&-2 AB AG IA: CPT | Performed by: STUDENT IN AN ORGANIZED HEALTH CARE EDUCATION/TRAINING PROGRAM

## 2022-05-04 PROCEDURE — 36415 COLL VENOUS BLD VENIPUNCTURE: CPT | Performed by: STUDENT IN AN ORGANIZED HEALTH CARE EDUCATION/TRAINING PROGRAM

## 2022-05-04 PROCEDURE — 86803 HEPATITIS C AB TEST: CPT | Performed by: STUDENT IN AN ORGANIZED HEALTH CARE EDUCATION/TRAINING PROGRAM

## 2022-05-04 PROCEDURE — 90471 IMMUNIZATION ADMIN: CPT | Performed by: STUDENT IN AN ORGANIZED HEALTH CARE EDUCATION/TRAINING PROGRAM

## 2022-05-04 PROCEDURE — 90715 TDAP VACCINE 7 YRS/> IM: CPT | Performed by: STUDENT IN AN ORGANIZED HEALTH CARE EDUCATION/TRAINING PROGRAM

## 2022-05-04 PROCEDURE — 82947 ASSAY GLUCOSE BLOOD QUANT: CPT | Performed by: STUDENT IN AN ORGANIZED HEALTH CARE EDUCATION/TRAINING PROGRAM

## 2022-05-04 PROCEDURE — 99396 PREV VISIT EST AGE 40-64: CPT | Mod: 25 | Performed by: STUDENT IN AN ORGANIZED HEALTH CARE EDUCATION/TRAINING PROGRAM

## 2022-05-04 PROCEDURE — 84443 ASSAY THYROID STIM HORMONE: CPT | Performed by: STUDENT IN AN ORGANIZED HEALTH CARE EDUCATION/TRAINING PROGRAM

## 2022-05-04 RX ORDER — BUPROPION HYDROCHLORIDE 300 MG/1
TABLET ORAL
COMMUNITY
End: 2022-09-09

## 2022-05-04 ASSESSMENT — ENCOUNTER SYMPTOMS
DIARRHEA: 0
SORE THROAT: 0
ARTHRALGIAS: 0
HEMATURIA: 0
WEAKNESS: 0
FREQUENCY: 0
JOINT SWELLING: 0
CHILLS: 0
FEVER: 0
ABDOMINAL PAIN: 0
NAUSEA: 0
COUGH: 0
BREAST MASS: 0
SHORTNESS OF BREATH: 0
EYE PAIN: 0
HEMATOCHEZIA: 0
HEADACHES: 0
PARESTHESIAS: 0
MYALGIAS: 1
PALPITATIONS: 0
HEARTBURN: 0
DIZZINESS: 0
CONSTIPATION: 0
DYSURIA: 0
NERVOUS/ANXIOUS: 0

## 2022-05-04 ASSESSMENT — PAIN SCALES - GENERAL: PAINLEVEL: NO PAIN (0)

## 2022-05-04 NOTE — PROGRESS NOTES
SUBJECTIVE:   CC: Dasia Reddy is an 46 year old woman who presents for preventive health visit.       Patient has been advised of split billing requirements and indicates understanding: Yes  Healthy Habits:     Getting at least 3 servings of Calcium per day:  Yes    Bi-annual eye exam:  Yes    Dental care twice a year:  Yes    Sleep apnea or symptoms of sleep apnea:  None    Diet:  Regular (no restrictions)    Frequency of exercise:  2-3 days/week    Duration of exercise:  15-30 minutes    Taking medications regularly:  Yes    Medication side effects:  None    PHQ-2 Total Score: 0    Additional concerns today:  No      Patient saw OBGYN and had labs done, she had a Cardio IQ advanced lipid test (scanned into chart, below were abnormal levels). She was advised by OBGYN to see a preventative cardiologist and have a CAC screen      Total cholesterol 230   Apolipoprotein 104     Exercises 3-4 days/week, cardio and weights    Has had higher than average cholesterol since early 30s.     fasting today     Weight creeping up. Having a hard time losing weight    [facility]DEYSIN - [facility] in [place], [place]  -> pap smear done 2 months ago       The 10-year ASCVD risk score (Mandy ANDIE Jr., et al., 2013) is: 0.3%    Values used to calculate the score:      Age: 46 years      Sex: Female      Is Non- : No      Diabetic: No      Tobacco smoker: No      Systolic Blood Pressure: 96 mmHg      Is BP treated: No      HDL Cholesterol: 83 mg/dL      Total Cholesterol: 210 mg/dL          Father - HLD, DM  PU - HLD, DM     No family hx of heart disease        Today's PHQ-2 Score:   PHQ-2 ( 1999 Pfizer) 5/4/2022   Q1: Little interest or pleasure in doing things 0   Q2: Feeling down, depressed or hopeless 0   PHQ-2 Score 0   PHQ-2 Total Score (12-17 Years)- Positive if 3 or more points; Administer PHQ-A if positive -   Q1: Little interest or pleasure in doing things Not at all   Q2: Feeling down, depressed or  hopeless Not at all   PHQ-2 Score 0       Abuse: Current or Past (Physical, Sexual or Emotional) - No  Do you feel safe in your environment? Yes    Have you ever done Advance Care Planning? (For example, a Health Directive, POLST, or a discussion with a medical provider or your loved ones about your wishes): Yes, patient states has an Advance Care Planning document and will bring a copy to the clinic.    Social History     Tobacco Use     Smoking status: Former Smoker     Quit date: 2000     Years since quittin.6     Smokeless tobacco: Never Used   Substance Use Topics     Alcohol use: Yes     Comment: 5 drinks/week     If you drink alcohol do you typically have >3 drinks per day or >7 drinks per week? No    Alcohol Use 2022   Prescreen: >3 drinks/day or >7 drinks/week? No   Prescreen: >3 drinks/day or >7 drinks/week? -       Reviewed orders with patient.  Reviewed health maintenance and updated orders accordingly - Yes  Lab work is in process  Labs reviewed in EPIC    Breast Cancer Screening:    Breast CA Risk Assessment (FHS-7) 2022   Do you have a family history of breast, colon, or ovarian cancer? No / Unknown       Mammogram Screening: Recommended annual mammography  Pertinent mammograms are reviewed under the imaging tab.    History of abnormal Pap smear: NO - age 30-65 PAP every 5 years with negative HPV co-testing recommended  PAP / HPV Latest Ref Rng & Units 10/15/2016   PAP (Historical) Negative Negative     Reviewed and updated as needed this visit by clinical staff   Tobacco  Allergies  Meds   Med Hx  Surg Hx  Fam Hx  Soc Hx          Reviewed and updated as needed this visit by Provider                   Past Medical History:   Diagnosis Date     Ovarian torsion       Past Surgical History:   Procedure Laterality Date     SURGICAL HISTORY OF -       right foot     SURGICAL HISTORY OF -       ovarian torsion. Able to save ovary. laparoscopy     OB History    Para Term  " AB Living   2 2 0 0 0 0   SAB IAB Ectopic Multiple Live Births   0 0 0 0 0      # Outcome Date GA Lbr Deny/2nd Weight Sex Delivery Anes PTL Lv   2 Para            1 Para                Review of Systems   Constitutional: Negative for chills and fever.   HENT: Negative for congestion, ear pain, hearing loss and sore throat.    Eyes: Negative for pain and visual disturbance.   Respiratory: Negative for cough and shortness of breath.    Cardiovascular: Negative for chest pain, palpitations and peripheral edema.   Gastrointestinal: Negative for abdominal pain, constipation, diarrhea, heartburn, hematochezia and nausea.   Breasts:  Negative for tenderness, breast mass and discharge.   Genitourinary: Negative for dysuria, frequency, genital sores, hematuria, pelvic pain, urgency, vaginal bleeding and vaginal discharge.   Musculoskeletal: Positive for myalgias. Negative for arthralgias and joint swelling.   Skin: Negative for rash.   Neurological: Negative for dizziness, weakness, headaches and paresthesias.   Psychiatric/Behavioral: Negative for mood changes. The patient is not nervous/anxious.         OBJECTIVE:   BP 96/68 (BP Location: Right arm, Patient Position: Chair, Cuff Size: Adult Regular)   Pulse 76   Temp 97.3  F (36.3  C) (Tympanic)   Resp 10   Ht 1.674 m (5' 5.91\")   Wt 74.5 kg (164 lb 3.2 oz)   SpO2 97%   BMI 26.58 kg/m    Physical Exam  GENERAL: healthy, alert and no distress  EYES: Eyes grossly normal to inspection, PERRL and conjunctivae and sclerae normal  HENT: ear canals and TM's normal, nose and mouth without ulcers or lesions  NECK: no adenopathy, no asymmetry, masses, or scars and thyroid normal to palpation  RESP: lungs clear to auscultation - no rales, rhonchi or wheezes  CV: regular rate and rhythm, normal S1 S2, no S3 or S4, no murmur, click or rub, no peripheral edema and peripheral pulses strong  ABDOMEN: soft, nontender, no hepatosplenomegaly, no masses and bowel sounds " normal  MS: no gross musculoskeletal defects noted, no edema  SKIN: no suspicious lesions or rashes  NEURO: Normal strength and tone, mentation intact and speech normal  PSYCH: mentation appears normal, affect normal/bright    Diagnostic Test Results:  Labs reviewed in Epic    ASSESSMENT/PLAN:   (Z00.00) Routine history and physical examination of adult  (primary encounter diagnosis)  Comment: normal vitals. BMI overweight    (E78.5) Hyperlipidemia LDL goal <100  Comment: had a cardio IQ advanced lipid screening done at outside labs, scanned into chart. This was ordered by OBGYN. She would like to see preventative cardiology. Based on ASCVD score we will wait on starting a statin. Discussed low-cholesterol diet. Family hx of DM, HLD - based on family hx and her recent labs she would like to have CAC screening done.   Plan: REFERRAL TO [facility] CARD[facility]OV[facility]ULAR        DISEASE PREVN, CT Coronary Artery Angio w         Calcium Score            (Z83.3) Family history of diabetes mellitus  Plan: CT Coronary Artery Angio w Calcium Score            (Z11.4) Screening for HIV (human immunodeficiency virus)  Plan: HIV Antigen Antibody Combo            (Z11.59) Need for hepatitis C screening test  Plan: Hepatitis C antibody            (Z23) Need for prophylactic vaccination against diphtheria and tetanus    Plan: TDAP VACCINE (Adacel, Boostrix)  [[id number]]            (Z13.1) Screening for diabetes mellitus  Plan: Glucose            (E66.3) Overweight  Comment: has been having issues losing weight despite diet and exercise. Will check TSH with her labs today.   Plan: TSH with free T4 reflex              Patient has been advised of split billing requirements and indicates understanding: Yes    COUNSELING:  Reviewed preventive health counseling, as reflected in patient instructions       Regular exercise       Healthy diet/nutrition       (Sayda)menopause management    Estimated body mass index is 26.58 kg/m  as  "calculated from the following:    Height as of this encounter: 1.674 m (5' 5.91\").    Weight as of this encounter: 74.5 kg (164 lb 3.2 oz).    Weight management plan: Discussed healthy diet and exercise guidelines    She reports that she quit smoking about 21 years ago. She has never used smokeless tobacco.      Counseling Resources:  ATP IV Guidelines  Pooled Cohorts Equation Calculator  Breast Cancer Risk Calculator  BRCA-Related Cancer Risk Assessment: FHS-7 Tool  FRAX Risk Assessment  ICSI Preventive Guidelines  Dietary Guidelines for Americans, 2010  USDA's MyPlate  ASA Prophylaxis  Lung CA Screening    Cara Sylvester DO  [facility]  "

## 2022-05-04 NOTE — PATIENT INSTRUCTIONS
Please call to schedule your imaging at: [phone]       Call to schedule preventative cardiology appointment     Screen for diabetes and thyroid dysfunction today

## 2022-05-05 LAB
FASTING STATUS PATIENT QL REPORTED: YES
GLUCOSE BLD-MCNC: 82 MG/DL (ref 70–99)
HCV AB SERPL QL IA: NONREACTIVE
HIV 1+2 AB+HIV1 P24 AG SERPL QL IA: NONREACTIVE
TSH SERPL DL<=0.005 MIU/L-ACNC: 1.92 MU/L (ref 0.4–4)

## 2022-06-24 ENCOUNTER — HOSPITAL ENCOUNTER (OUTPATIENT)
Dept: CARDIOLOGY | Facility: CLINIC | Age: 47
Discharge: HOME | End: 2022-06-24
Attending: STUDENT IN AN ORGANIZED HEALTH CARE EDUCATION/TRAINING PROGRAM | Admitting: STUDENT IN AN ORGANIZED HEALTH CARE EDUCATION/TRAINING PROGRAM

## 2022-06-24 DIAGNOSIS — Z83.3 FAMILY HISTORY OF DIABETES MELLITUS: ICD-10-CM

## 2022-06-24 DIAGNOSIS — E78.5 HYPERLIPIDEMIA LDL GOAL <100: ICD-10-CM

## 2022-06-24 PROCEDURE — 75571 CT HRT W/O DYE W/CA TEST: CPT | Mod: 26 | Performed by: INTERNAL MEDICINE

## 2022-06-24 PROCEDURE — 75571 CT HRT W/O DYE W/CA TEST: CPT

## 2022-07-16 NOTE — PATIENT INSTRUCTIONS
Patient Education     Understanding Urinary Tract Infections (UTIs)   Most UTIs are caused by bacteria, but they may also be caused by viruses or fungi. Bacteria from the bowel are the most common source of infection. The infection may start because of any of the following:     Sexual activity.  During sex, bacteria can travel from the penis, vagina, or rectum into the urethra.     Bacteria outside the rectum getting into the urethra.  Bacteria on the skin outside the rectum may travel into the urethra. This is more common in women since the rectum and urethra are closer to each other than in men. Wiping from front to back after using the toilet and keeping the area clean can help prevent germs from getting to the urethra.    Blocked urine flow through the urinary tract. If urine sits too long, germs may start to grow out of control.  Parts of the urinary tract  The infection can occur in any part of the urinary tract.       The kidneys. These organs collect and store urine.    The ureters. These tubes carry urine from the kidneys to the bladder.    The bladder. This holds urine until you are ready to let it out.    The urethra. This tube carries urine from the bladder out of the body. It is shorter in women, so bacteria can move through it more easily. The urethra is longer in men, so a UTI is less likely to reach the bladder or kidneys in men.  [employer] last reviewed this educational content on 1/1/2020 2000-2021 [employer]. All rights reserved. This information is not intended as a substitute for professional medical care. Always follow your healthcare professional's instructions.            12 hour chart review complete.    EKG strip reviewed and agree with measurement.

## 2022-09-09 ENCOUNTER — VIRTUAL VISIT (OUTPATIENT)
Dept: FAMILY MEDICINE | Facility: CLINIC | Age: 47
End: 2022-09-09
Payer: COMMERCIAL

## 2022-09-09 DIAGNOSIS — F41.1 GAD (GENERALIZED ANXIETY DISORDER): Primary | ICD-10-CM

## 2022-09-09 PROCEDURE — 99214 OFFICE O/P EST MOD 30 MIN: CPT | Mod: 95 | Performed by: NURSE PRACTITIONER

## 2022-09-09 PROCEDURE — 96127 BRIEF EMOTIONAL/BEHAV ASSMT: CPT | Mod: 95 | Performed by: NURSE PRACTITIONER

## 2022-09-09 RX ORDER — BUPROPION HYDROCHLORIDE 150 MG/1
150 TABLET ORAL EVERY MORNING
Qty: 14 TABLET | Refills: 0 | Status: SHIPPED | OUTPATIENT
Start: 2022-09-09 | End: 2022-10-27

## 2022-09-09 ASSESSMENT — PATIENT HEALTH QUESTIONNAIRE - PHQ9
SUM OF ALL RESPONSES TO PHQ QUESTIONS 1-9: 2
5. POOR APPETITE OR OVEREATING: NEARLY EVERY DAY

## 2022-09-09 ASSESSMENT — ANXIETY QUESTIONNAIRES
IF YOU CHECKED OFF ANY PROBLEMS ON THIS QUESTIONNAIRE, HOW DIFFICULT HAVE THESE PROBLEMS MADE IT FOR YOU TO DO YOUR WORK, TAKE CARE OF THINGS AT HOME, OR GET ALONG WITH OTHER PEOPLE: NOT DIFFICULT AT ALL
1. FEELING NERVOUS, ANXIOUS, OR ON EDGE: NEARLY EVERY DAY
GAD7 TOTAL SCORE: 11
6. BECOMING EASILY ANNOYED OR IRRITABLE: NEARLY EVERY DAY
3. WORRYING TOO MUCH ABOUT DIFFERENT THINGS: SEVERAL DAYS
7. FEELING AFRAID AS IF SOMETHING AWFUL MIGHT HAPPEN: NOT AT ALL
GAD7 TOTAL SCORE: 11
2. NOT BEING ABLE TO STOP OR CONTROL WORRYING: SEVERAL DAYS
5. BEING SO RESTLESS THAT IT IS HARD TO SIT STILL: NOT AT ALL

## 2022-09-09 NOTE — PROGRESS NOTES
Dasia is a 47 year old who is being evaluated via a billable telephone visit.      What phone number would you like to be contacted at? [phone]  How would you like to obtain your AVS? Naida    Assessment & Plan     Dasia was seen today for recheck medication and refill request.    Diagnoses and all orders for this visit:    FRANCESCA (generalized anxiety disorder)  Previously on Wellbutrin  mg for anxiety, discussed not typical first line treatment for anxiety.  Recommend tapering off Wellbutrin and starting selective serotonin reuptake inhibitor.  Decrease Wellbutrin XL to 150 mg daily for 7-14 days and then discontinue.   Follow-up in 6 weeks, sooner as needed.    -     sertraline (ZOLOFT) 50 MG tablet; Take 1/2 tablet (25 mg) by mouth once daily for 7 days and then increase to 1 tablet (50 mg) by mouth once daily  -     buPROPion (WELLBUTRIN XL) 150 MG 24 hr tablet; Take 1 tablet (150 mg) by mouth every morning for 14 days        Return in about 6 weeks (around 10/21/2022) for Med check, Telephone Visit.    Tavia Mccall, [facility]      Dasia is a 47 year old, presenting for the following health issues:    Recheck Medication and Refill Request    HPI     Anxiety Follow-Up    Patient reports being on Wellbutrin XL for anxiety. Worked well at first and then found she was more anxious.   Increased dose to 300 mg daily.    Now most recently anxiety has increased again.     No other history of medication for anxiety.      Traveling to [facility] at the end of this month.  Rewards trip for work.        How are you doing with your depression since your last visit? No change    How are you doing with your anxiety since your last visit?  Worsened    Are you having other symptoms that might be associated with depression or anxiety? No    Have you had a significant life event? OTHER: Moved out of home     Do you have any concerns with your use of alcohol or other drugs?  No    Social History     Tobacco Use     Smoking status: Former Smoker     Quit date: 2000     Years since quittin.0     Smokeless tobacco: Never Used   Substance Use Topics     Alcohol use: Yes     Comment: 5 drinks/week     Drug use: No     PHQ 2022   PHQ-9 Total Score 2   Q9: Thoughts of better off dead/self-harm past 2 weeks Not at all     FRANCESCA-7 SCORE 2022   Total Score 11     Last PHQ-9 2022   1.  Little interest or pleasure in doing things 0   2.  Feeling down, depressed, or hopeless 0   3.  Trouble falling or staying asleep, or sleeping too much 2   4.  Feeling tired or having little energy 0   5.  Poor appetite or overeating 0   6.  Feeling bad about yourself 0   7.  Trouble concentrating 0   8.  Moving slowly or restless 0   Q9: Thoughts of better off dead/self-harm past 2 weeks 0   PHQ-9 Total Score 2   Difficulty at work, home, or with people Not difficult at all     FRANCESCA-7  2022   1. Feeling nervous, anxious, or on edge 3   2. Not being able to stop or control worrying 1   3. Worrying too much about different things 1   4. Trouble relaxing 3   5. Being so restless that it is hard to sit still 0   6. Becoming easily annoyed or irritable 3   7. Feeling afraid, as if something awful might happen 0   FRANCESCA-7 Total Score 11   If you checked any problems, how difficult have they made it for you to do your work, take care of things at home, or get along with other people? Not difficult at all       Review of Systems     Constitutional, HEENT, cardiovascular, pulmonary, gi and gu systems are negative, except as otherwise noted.      Objective       Vitals:    No vitals were obtained today due to virtual visit.    Physical Exam   General:  healthy, alert and no distress  PSYCH: Alert and oriented times 3; coherent speech, normal   rate and volume, able to articulate logical thoughts, able   to abstract reason, no tangential thoughts, no hallucinations   or delusions  Her affect is  normal  RESP: No cough, no audible wheezing, able to talk in full sentences  Remainder of exam unable to be completed due to telephone visits        Phone call duration: 10 minutes  1:27 p.m. to 1:37 p.m.

## 2022-10-04 DIAGNOSIS — F41.1 GAD (GENERALIZED ANXIETY DISORDER): ICD-10-CM

## 2022-10-26 ASSESSMENT — ANXIETY QUESTIONNAIRES
1. FEELING NERVOUS, ANXIOUS, OR ON EDGE: SEVERAL DAYS
7. FEELING AFRAID AS IF SOMETHING AWFUL MIGHT HAPPEN: NOT AT ALL
8. IF YOU CHECKED OFF ANY PROBLEMS, HOW DIFFICULT HAVE THESE MADE IT FOR YOU TO DO YOUR WORK, TAKE CARE OF THINGS AT HOME, OR GET ALONG WITH OTHER PEOPLE?: NOT DIFFICULT AT ALL
GAD7 TOTAL SCORE: 4
2. NOT BEING ABLE TO STOP OR CONTROL WORRYING: SEVERAL DAYS
3. WORRYING TOO MUCH ABOUT DIFFERENT THINGS: SEVERAL DAYS
GAD7 TOTAL SCORE: 4
5. BEING SO RESTLESS THAT IT IS HARD TO SIT STILL: NOT AT ALL
GAD7 TOTAL SCORE: 4
6. BECOMING EASILY ANNOYED OR IRRITABLE: SEVERAL DAYS
7. FEELING AFRAID AS IF SOMETHING AWFUL MIGHT HAPPEN: NOT AT ALL
4. TROUBLE RELAXING: NOT AT ALL
IF YOU CHECKED OFF ANY PROBLEMS ON THIS QUESTIONNAIRE, HOW DIFFICULT HAVE THESE PROBLEMS MADE IT FOR YOU TO DO YOUR WORK, TAKE CARE OF THINGS AT HOME, OR GET ALONG WITH OTHER PEOPLE: NOT DIFFICULT AT ALL

## 2022-10-27 ENCOUNTER — VIRTUAL VISIT (OUTPATIENT)
Dept: FAMILY MEDICINE | Facility: CLINIC | Age: 47
End: 2022-10-27
Payer: COMMERCIAL

## 2022-10-27 DIAGNOSIS — F41.1 GAD (GENERALIZED ANXIETY DISORDER): Primary | ICD-10-CM

## 2022-10-27 DIAGNOSIS — G47.00 INSOMNIA, UNSPECIFIED TYPE: ICD-10-CM

## 2022-10-27 DIAGNOSIS — Z12.11 SCREEN FOR COLON CANCER: ICD-10-CM

## 2022-10-27 PROCEDURE — 96127 BRIEF EMOTIONAL/BEHAV ASSMT: CPT | Mod: 95 | Performed by: NURSE PRACTITIONER

## 2022-10-27 PROCEDURE — 99213 OFFICE O/P EST LOW 20 MIN: CPT | Mod: 95 | Performed by: NURSE PRACTITIONER

## 2022-10-27 RX ORDER — ZOLPIDEM TARTRATE 5 MG/1
2.5 TABLET ORAL
Qty: 20 TABLET | Refills: 0 | Status: SHIPPED | OUTPATIENT
Start: 2022-10-27 | End: 2024-02-13

## 2022-10-27 ASSESSMENT — PATIENT HEALTH QUESTIONNAIRE - PHQ9: SUM OF ALL RESPONSES TO PHQ QUESTIONS 1-9: 0

## 2022-10-27 ASSESSMENT — ANXIETY QUESTIONNAIRES: GAD7 TOTAL SCORE: 4

## 2022-10-27 NOTE — PROGRESS NOTES
"Dasia is a 47 year old who is being evaluated via a billable video visit.      How would you like to obtain your AVS? MyChart  If the video visit is dropped, the invitation should be resent by: Text to cell phone: [phone]  Will anyone else be joining your video visit? No    Assessment & Plan     Dasia was seen today for recheck medication and refill request.    Diagnoses and all orders for this visit:    FRANCESCA (generalized anxiety disorder)  Previously on Wellbutrin  mg for anxiety, tapered off medication - tolerated well and initiated selective serotonin reuptake inhibitor - Sertraline 50 mg daily.    -     sertraline (ZOLOFT) 50 MG tablet; Take 1 tablet (50 mg) by mouth daily    Insomnia, unspecified type  Intermittent use for travel related sleep issues.    -     zolpidem (AMBIEN) 5 MG tablet; Take 0.5 tablets (2.5 mg) by mouth nightly as needed for sleep    Screen for colon cancer  -     Colonoscopy Screening  Referral; Future         BMI:   Estimated body mass index is 26.58 kg/m  as calculated from the following:    Height as of 5/4/22: 1.674 m (5' 5.91\").    Weight as of 5/4/22: 74.5 kg (164 lb 3.2 oz).     Return in about 7 months (around 5/27/2023) for Preventative Visit.    Tavia Mccall, [facility] [place]      Subjective   Dasia is a 47 year old, presenting for the following health issues:  Recheck Medication and Refill Request  History of Present Illness   Last virtual visit was 9/9/22    FRANCESCA (generalized anxiety disorder)  Previously on Wellbutrin  mg for anxiety, discussed not typical first line treatment for anxiety.  Recommend tapering off Wellbutrin and starting selective serotonin reuptake inhibitor.  Decrease Wellbutrin XL to 150 mg daily for 7-14 days and then discontinue.   Follow-up in 6 weeks, sooner as needed.    -     sertraline (ZOLOFT) 50 MG tablet; Take 1/2 tablet (25 mg) by mouth once daily for 7 days and then increase to 1 tablet (50 mg) " by mouth once daily  -     buPROPion (WELLBUTRIN XL) 150 MG 24 hr tablet; Take 1 tablet (150 mg) by mouth every morning for 14 days    Did well during travel to [facility].    Doing well with medication adjustment.  No issues with tapering down on Wellbutrin XL    FRANCESCA-7 SCORE 2022 10/26/2022   Total Score - 4 (minimal anxiety)   Total Score 11 4     PHQ 2022 10/27/2022   PHQ-9 Total Score 2 0   Q9: Thoughts of better off dead/self-harm past 2 weeks Not at all Not at all             Mental Health Follow-up:  Patient presents to follow-up on Anxiety.    Patient's anxiety since last visit has been:  Better  The patient is not having other symptoms associated with anxiety.  Any significant life events: No  Patient is not feeling anxious or having panic attacks.  Patient has no concerns about alcohol or drug use.    She eats 2-3 servings of fruits and vegetables daily.She consumes 0 sweetened beverage(s) daily.She exercises with enough effort to increase her heart rate 10 to 19 minutes per day.  She exercises with enough effort to increase her heart rate 4 days per week. She is missing 1 dose(s) of medications per week.  She is not taking prescribed medications regularly due to remembering to take.  Today's FRANCESCA-7 Score: 4     Depression and Anxiety Follow-Up    Social History     Tobacco Use     Smoking status: Former     Types: Cigarettes     Quit date: 2000     Years since quittin.1     Smokeless tobacco: Never   Vaping Use     Vaping Use: Never used   Substance Use Topics     Alcohol use: Yes     Comment: 5 drinks/week     Drug use: No     PHQ 2022 10/27/2022   PHQ-9 Total Score 2 0   Q9: Thoughts of better off dead/self-harm past 2 weeks Not at all Not at all     FRANCESCA-7 SCORE 2022 10/26/2022   Total Score - 4 (minimal anxiety)   Total Score 11 4     Last PHQ-9 10/27/2022   1.  Little interest or pleasure in doing things 0   2.  Feeling down, depressed, or hopeless 0   3.  Trouble falling or  staying asleep, or sleeping too much 0   4.  Feeling tired or having little energy 0   5.  Poor appetite or overeating 0   6.  Feeling bad about yourself 0   7.  Trouble concentrating 0   8.  Moving slowly or restless 0   Q9: Thoughts of better off dead/self-harm past 2 weeks 0   PHQ-9 Total Score 0   Difficulty at work, home, or with people -     FRANCESCA-7  10/26/2022   1. Feeling nervous, anxious, or on edge 1   2. Not being able to stop or control worrying 1   3. Worrying too much about different things 1   4. Trouble relaxing 0   5. Being so restless that it is hard to sit still 0   6. Becoming easily annoyed or irritable 1   7. Feeling afraid, as if something awful might happen 0   FRANCESCA-7 Total Score 4   If you checked any problems, how difficult have they made it for you to do your work, take care of things at home, or get along with other people? Not difficult at all         Review of Systems   Constitutional, HEENT, cardiovascular, pulmonary, gi and gu systems are negative, except as otherwise noted.      Objective       Vitals:  No vitals were obtained today due to virtual visit.    Physical Exam   GENERAL: Healthy, alert and no distress  PSYCH: Mentation appears normal, affect normal/bright, judgement and insight intact        Telephone Visit Details    Start Time: 3:35 p.m.     Video End Time:3:45 PM    Originating Location (pt. Location): Home    Distant Location (provider location):  On-site    Platform used for Video Visit: Unable to complete video visit

## 2022-11-01 ENCOUNTER — HOSPITAL ENCOUNTER (OUTPATIENT)
Facility: CLINIC | Age: 47
End: 2022-11-01
Attending: COLON & RECTAL SURGERY | Admitting: COLON & RECTAL SURGERY
Payer: COMMERCIAL

## 2022-11-01 ENCOUNTER — TELEPHONE (OUTPATIENT)
Dept: GASTROENTEROLOGY | Facility: CLINIC | Age: 47
End: 2022-11-01

## 2022-11-01 DIAGNOSIS — Z12.11 ENCOUNTER FOR SCREENING COLONOSCOPY: Primary | ICD-10-CM

## 2022-11-01 NOTE — TELEPHONE ENCOUNTER
Screening Questions  BLUE  KIND OF PREP RED  LOCATION [review exclusion criteria] GREEN  SEDATION TYPE        Y Are you active on mychart?       Tavia Mccall Ordering/Referring Provider?        [employer] What type of coverage do you have?      N Have you had a positive covid test in the last 90 days?     27.3 1. BMI  [BMI 40+ - review exclusion criteria]    Y  2. Are you able to give consent for your medical care? [IF NO,RN REVIEW]        N  3. Are you taking any prescription pain medications on a routine schedule?      N  3a. EXTENDED PREP What kind of prescription?     N 4. Do you have any chemical dependencies such as alcohol, street drugs, or methadone?    N 5. Do you have any history of post-traumatic stress syndrome, severe anxiety or history of psychosis?      **If yes 3- 5 , please schedule with MAC sedation.**          IF YES TO ANY 6 - 10 - HOSPITAL SETTING ONLY.     N 6.   Do you need assistance transferring?     N 7.   Have you had a heart or lung transplant?    N 8.   Are you currently on dialysis?   N 9.   Do you use daily home oxygen?   N 10. Do you take nitroglycerin?   10a. N If yes, how often?     11. [FEMALES]  N Are you currently pregnant?    11a. N If yes, how many weeks? [ Greater than 12 weeks, OR NEEDED]    N 12. Do you have Pulmonary Hypertension? *NEED PAC APPT AT [facility]*     N 13. [review exclusion criteria]  Do you have any implantable devices in your body (pacemaker, defib, LVAD)?    N 14. In the past 6 months, have you had any heart related issues including cardiomyopathy or heart attack?     14a. N If yes, did it require cardiac stenting if so when?     N 15. Have you had a stroke or Transient ischemic attack (TIA - aka  mini stroke ) within 6 months?      N 16. Do you have mod to severe Obstructive Sleep Apnea?  [Hospital only - Ok at [place]]    N 17. Do you have SEVERE AND UNCONTROLLED asthma? *NEED PAC APPT AT [facility]*     N 18. Are you currently taking any blood thinners?     " 18a. If yes, inform patient to \"follow up w/ ordering provider for bridging instructions.\"    N 19. Do you take the medication Phentermine?    19a. If yes, \"Hold for 7 days before procedure.  Please consult your prescribing provider if you have questions about holding this medication.\"     N  20. Do you have chronic kidney disease?      N  21. Do you have a diagnosis of diabetes?     N  22. On a regular basis do you go 3-5 days between bowel movements?      23. Preferred LOCAL Pharmacy for Pre Prescription    [ LIST ONLY ONE PHARMACY]     [facility] [id number] [place] [facility] [address]        - CLOSING REMINDERS -    Informed patient they will need an adult    Cannot take any type of public or medical transportation alone    Conscious Sedation- Needs  for 6 hours after the procedure       MAC/General-Needs  for 24 hours after procedure    Pre-Procedure Covid test to be completed [[facility] PCR Testing Required]    Confirmed Nurse will call to complete assessment       - SCHEDULING DETAILS -     TATIANNA  Surgeon    12/21/22 Date of Procedure  Lower Endoscopy [Colonoscopy]  Type of Procedure Scheduled    Location  [facility] PREP-If you answer yes to questions #8, #20, #21Which Colonoscopy Prep was Sent?     CS Sedation Type      PAC / Pre-op Required         Additional comments:            "

## 2022-11-19 ENCOUNTER — HEALTH MAINTENANCE LETTER (OUTPATIENT)
Age: 47
End: 2022-11-19

## 2022-12-15 RX ORDER — BISACODYL 5 MG
TABLET, DELAYED RELEASE (ENTERIC COATED) ORAL
Qty: 4 TABLET | Refills: 0 | Status: SHIPPED | OUTPATIENT
Start: 2022-12-15 | End: 2022-12-19 | Stop reason: HOSPADM

## 2023-02-27 ENCOUNTER — NURSE TRIAGE (OUTPATIENT)
Dept: FAMILY MEDICINE | Facility: CLINIC | Age: 48
End: 2023-02-27
Payer: COMMERCIAL

## 2023-02-27 ENCOUNTER — MYC MEDICAL ADVICE (OUTPATIENT)
Dept: FAMILY MEDICINE | Facility: CLINIC | Age: 48
End: 2023-02-27
Payer: COMMERCIAL

## 2023-02-27 DIAGNOSIS — U07.1 INFECTION DUE TO 2019 NOVEL CORONAVIRUS: Primary | ICD-10-CM

## 2023-02-27 NOTE — TELEPHONE ENCOUNTER
RN COVID TREATMENT VISIT  02/27/23      The patient has been triaged and does not require a higher level of care.    Dasia Reddy  47 year old  Current weight? 165 lbs    Has the patient been seen by a primary care provider at an [facility] or [facility] within the past two years? Yes.   Have you been in close proximity to/do you have a known exposure to a person with a confirmed case of influenza? No.     General treatment eligibility:  Date of positive COVID test (PCR or at home)?  2/26/23    Are you or have you been hospitalized for this COVID-19 infection? No.   Have you received monoclonal antibodies or antiviral treatment for COVID-19 since this positive test? No.   Do you have any of the following conditions that place you at risk of being very sick from COVID-19?   - Mental health disorders including mood disorders, depression, schizophrenia spectrum disorders   Yes, patient has at least one high risk condition as noted above.     Current COVID symptoms:   - fever or chills  - fatigue  - muscle or body aches  - congestion or runny nose  Yes. Patient has at least one symptom as selected.     How many days since symptoms started? 5 days or less. Established patient, 12 years or older weighing at least 88.2 lbs, who has symptoms that started in the past 5 days, has not been hospitalized nor received treatment already, and is at risk for being very sick from COVID-19.     Treatment eligibility by RN:    Are you currently pregnant or nursing? No    Do you have a clinically significant hypersensitivity to nirmatrelvir or ritonavir, or toxic epidermal necrolysis (TEN) or English-Harry Syndrome? No    Do you have a history of hepatitis, any hepatic impairment on the Problem List (such as Child-Laguerre Class C, cirrhosis, fatty liver disease, alcoholic liver disease), or was the last liver lab (hepatic panel, ALT, AST, ALK Phos, bilirubin) elevated in the past 6 months? No    Do you have  any history of severe renal impairment (eGFR < 30mL/min)? No    Is patient eligible to continue?   Yes, patient meets all eligibility requirements for the RN COVID treatment (as denoted by all no responses above).     Current Outpatient Medications   Medication Sig Dispense Refill     MIRENA 20 MCG/24HR IU IUD   0     MULTI-VITAMIN OR TABS 1 TABLET DAILY 30 0     sertraline (ZOLOFT) 50 MG tablet Take 1 tablet (50 mg) by mouth daily 90 tablet 3     zolpidem (AMBIEN) 5 MG tablet Take 0.5 tablets (2.5 mg) by mouth nightly as needed for sleep 20 tablet 0       Medications from List 1 of the standing order (on medications that exclude the use of Paxlovid) that patient is taking: NONE. Is patient taking [place]'s Wort? No  Is patient taking [place]'s Wort or any meds from List 1? No.   Medications from List 2 of the standing order (on meds that provider needs to adjust) that patient is taking: NONE. Is patient on any of the meds from List 2? No.   Medications from List 3 of standing order (on meds that a RN needs to adjust) that patient is taking: zolpidem (Ambien, Intermezzo, Edluar): Is patient taking as needed and less than daily? Yes, instructed patient to stop taking zolpidem while taking Paxlovid and restart zolpidem 3 days after the completion of Paxlovid. Is patient on any meds from List 3? Yes. Patient is on meds from list 3. No meds require a provider visit and at least one med required RN to adjust.     Paxlovid has an approximate 90% reduction in hospitalization. Paxlovid can possibly cause altered sense of taste, diarrhea (loose, watery stools), high blood pressure, muscle aches.     Would patient like a Paxlovid prescription?   Yes.   Lab Results   Component Value Date    GFRESTIMATED >60 03/19/2014       Was last eGFR reduced? No, eGFR 60 or greater/ No Result on record. Patient can receive the normal renal function dose. Paxlovid Rx sent to [facility] [phone]  [facility] [address], [address]  [place] [address]    Hours:  Mon-Fri: 7:00a - 7:00p    Drive-thru services available.    Temporary change to home medications: hold Zolpidem during treatment    All medication adjustments (holds, etc) were discussed with the patient and patient was asked to repeat back (teachback) their med adjustment.  Did patient understand med adjustment? Yes, patient repeated back and understood correctly.        Reviewed the following instructions with the patient:    Paxlovid (nimatrelvir and ritonavir)    How it works  Two medicines (nirmatrelvir and ritonavir) are taken together. They stop the virus from growing. Less amount of virus is easier for your body to fight.    How to take    Medicine comes in a daily container with both medicine tablets. Take by mouth twice daily (once in the morning, once at night) for 5 days.    The number of tablets to take varies by patient.    Don't chew or break capsules. Swallow whole.    When to take  Take as soon as possible after positive COVID-19 test result, and within 5 days of your first symptoms.    Possible side effects  Can cause altered sense of taste, diarrhea (loose, watery stools), high blood pressure, muscle aches.    Erendira Arenas RN

## 2023-02-27 NOTE — TELEPHONE ENCOUNTER
Reason for Disposition    [1] COVID-19 diagnosed by positive lab test (e.g., PCR, rapid self-test kit) AND [2] mild symptoms (e.g., cough, fever, others) AND [3] no complications or SOB    Additional Information    Negative: SEVERE difficulty breathing (e.g., struggling for each breath, speaks in single words)    Negative: Difficult to awaken or acting confused (e.g., disoriented, slurred speech)    Negative: Bluish (or gray) lips or face now    Negative: Shock suspected (e.g., cold/pale/clammy skin, too weak to stand, low BP, rapid pulse)    Negative: Sounds like a life-threatening emergency to the triager    Negative: [1] Diagnosed or suspected COVID-19 AND [2] symptoms lasting 3 or more weeks    Negative: [1] COVID-19 exposure AND [2] no symptoms    Negative: COVID-19 vaccine reaction suspected (e.g., fever, headache, muscle aches) occurring 1 to 3 days after getting vaccine    Negative: COVID-19 vaccine, questions about    Negative: [1] Lives with someone known to have influenza (flu test positive) AND [2] flu-like symptoms (e.g., cough, runny nose, sore throat, SOB; with or without fever)    Negative: [1] Adult with possible COVID-19 symptoms AND [2] triager concerned about severity of symptoms or other causes    Negative: COVID-19 and breastfeeding, questions about    Negative: SEVERE or constant chest pain or pressure  (Exception: Mild central chest pain, present only when coughing.)    Negative: MODERATE difficulty breathing (e.g., speaks in phrases, SOB even at rest, pulse 100-120)    Negative: Headache and stiff neck (can't touch chin to chest)    Negative: Oxygen level (e.g., pulse oximetry) 90 percent or lower    Negative: Chest pain or pressure  (Exception: MILD central chest pain, present only when coughing)    Negative: Patient sounds very sick or weak to the triager    Negative: MILD difficulty breathing (e.g., minimal/no SOB at rest, SOB with walking, pulse <100)    Negative: Fever > 103 F (39.4  "C)    Negative: [1] Fever > 101 F (38.3 C) AND [2] over 60 years of age    Negative: [1] Fever > 100.0 F (37.8 C) AND [2] bedridden (e.g., nursing home patient, CVA, chronic illness, recovering from surgery)    Negative: [1] HIGH RISK for severe COVID complications (e.g., weak immune system, age > 64 years, obesity with BMI of 30 or higher, pregnant, chronic lung disease or other chronic medical condition) AND [2] COVID symptoms (e.g., cough, fever)  (Exceptions: Already seen by PCP and no new or worsening symptoms.)    Negative: [1] HIGH RISK patient AND [2] influenza is widespread in the community AND [3] ONE OR MORE respiratory symptoms: cough, sore throat, runny or stuffy nose    Negative: [1] HIGH RISK patient AND [2] influenza exposure within the last 7 days AND [3] ONE OR MORE respiratory symptoms: cough, sore throat, runny or stuffy nose    Negative: Oxygen level (e.g., pulse oximetry) 91 to 94 percent    Negative: [1] COVID-19 infection suspected by caller or triager AND [2] mild symptoms (cough, fever, or others) AND [3] negative COVID-19 rapid test    Negative: Fever present > 3 days (72 hours)    Negative: [1] Fever returns after gone for over 24 hours AND [2] symptoms worse or not improved    Negative: [1] Continuous (nonstop) coughing interferes with work or school AND [2] no improvement using cough treatment per Care Advice    Negative: Cough present > 3 weeks    Answer Assessment - Initial Assessment Questions  1. COVID-19 DIAGNOSIS: \"Who made your COVID-19 diagnosis?\" \"Was it confirmed by a positive lab test or self-test?\" If not diagnosed by a doctor (or NP/PA), ask \"Are there lots of cases (community spread) where you live?\" Note: See public health department website, if unsure.      Home test  2. COVID-19 EXPOSURE: \"Was there any known exposure to COVID before the symptoms began?\" CDC Definition of close contact: within 6 feet (2 meters) for a total of 15 minutes or more over a 24-hour period.    " "   unsure  3. ONSET: \"When did the COVID-19 symptoms start?\"       2/26/23  4. WORST SYMPTOM: \"What is your worst symptom?\" (e.g., cough, fever, shortness of breath, muscle aches)      Body aches  5. COUGH: \"Do you have a cough?\" If Yes, ask: \"How bad is the cough?\"        Yes, mild  6. FEVER: \"Do you have a fever?\" If Yes, ask: \"What is your temperature, how was it measured, and when did it start?\"      no  7. RESPIRATORY STATUS: \"Describe your breathing?\" (e.g., shortness of breath, wheezing, unable to speak)       ok  8. BETTER-SAME-WORSE: \"Are you getting better, staying the same or getting worse compared to yesterday?\"  If getting worse, ask, \"In what way?\"      same  9. HIGH RISK DISEASE: \"Do you have any chronic medical problems?\" (e.g., asthma, heart or lung disease, weak immune system, obesity, etc.)      no  10. VACCINE: \"Have you had the COVID-19 vaccine?\" If Yes, ask: \"Which one, how many shots, when did you get it?\"        Yes   11. BOOSTER: \"Have you received your COVID-19 booster?\" If Yes, ask: \"Which one and when did you get it?\"        yes  12. PREGNANCY: \"Is there any chance you are pregnant?\" \"When was your last menstrual period?\"        no  13. OTHER SYMPTOMS: \"Do you have any other symptoms?\"  (e.g., chills, fatigue, headache, loss of smell or taste, muscle pain, sore throat)        Fatigue, muscle pain  14. O2 SATURATION MONITOR:  \"Do you use an oxygen saturation monitor (pulse oximeter) at home?\" If Yes, ask \"What is your reading (oxygen level) today?\" \"What is your usual oxygen saturation reading?\" (e.g., 95%)        No.  No sob    Protocols used: CORONAVIRUS (COVID-19) DIAGNOSED OR [id number]    "

## 2023-02-28 NOTE — TELEPHONE ENCOUNTER
Pt was treated with paxlovid on 2/27/2023    Monika Alvarado RN, BSN  [facility]

## 2023-07-02 ENCOUNTER — HEALTH MAINTENANCE LETTER (OUTPATIENT)
Age: 48
End: 2023-07-02

## 2023-11-22 DIAGNOSIS — F41.1 GAD (GENERALIZED ANXIETY DISORDER): ICD-10-CM

## 2024-01-10 ENCOUNTER — VIRTUAL VISIT (OUTPATIENT)
Dept: FAMILY MEDICINE | Facility: CLINIC | Age: 49
End: 2024-01-10
Payer: COMMERCIAL

## 2024-01-10 DIAGNOSIS — F41.1 GAD (GENERALIZED ANXIETY DISORDER): Primary | ICD-10-CM

## 2024-01-10 PROCEDURE — 99213 OFFICE O/P EST LOW 20 MIN: CPT | Mod: 95

## 2024-01-10 ASSESSMENT — ANXIETY QUESTIONNAIRES
5. BEING SO RESTLESS THAT IT IS HARD TO SIT STILL: NOT AT ALL
IF YOU CHECKED OFF ANY PROBLEMS ON THIS QUESTIONNAIRE, HOW DIFFICULT HAVE THESE PROBLEMS MADE IT FOR YOU TO DO YOUR WORK, TAKE CARE OF THINGS AT HOME, OR GET ALONG WITH OTHER PEOPLE: NOT DIFFICULT AT ALL
6. BECOMING EASILY ANNOYED OR IRRITABLE: MORE THAN HALF THE DAYS
7. FEELING AFRAID AS IF SOMETHING AWFUL MIGHT HAPPEN: NOT AT ALL
1. FEELING NERVOUS, ANXIOUS, OR ON EDGE: SEVERAL DAYS
3. WORRYING TOO MUCH ABOUT DIFFERENT THINGS: SEVERAL DAYS
4. TROUBLE RELAXING: SEVERAL DAYS
7. FEELING AFRAID AS IF SOMETHING AWFUL MIGHT HAPPEN: NOT AT ALL
GAD7 TOTAL SCORE: 6
8. IF YOU CHECKED OFF ANY PROBLEMS, HOW DIFFICULT HAVE THESE MADE IT FOR YOU TO DO YOUR WORK, TAKE CARE OF THINGS AT HOME, OR GET ALONG WITH OTHER PEOPLE?: NOT DIFFICULT AT ALL
GAD7 TOTAL SCORE: 6
GAD7 TOTAL SCORE: 6
2. NOT BEING ABLE TO STOP OR CONTROL WORRYING: SEVERAL DAYS

## 2024-01-10 NOTE — PROGRESS NOTES
Dasia is a 48 year old who is being evaluated via a billable video visit.      How would you like to obtain your AVS? MyChart  If the video visit is dropped, the invitation should be resent by: Text to cell phone: [phone]  Will anyone else be joining your video visit? No          Assessment & Plan     FRANCESCA (generalized anxiety disorder)  Stable.  No concerns for suicidal ideation or acute increase in anxiety status.  Mood stable at 50 mg of Zoloft daily.  Okay to refill this medication in the short-term, but stressed the importance of the patient to establish care with a new primary care provider who will be able to assess, monitor, adjust dosing for anxiety and medication in the long-term.  Patient does want to discuss changing to a different medication considering heartburn side effect, but discussed with her that I am not comfortable doing this, as I will not be able to follow-up with her more long-term.  Not immediately necessary considering low fidelity of side effect symptoms, would be justifiable conversation to have with primary care provider at next visit.  Plan: 30 days of Zoloft 50 mg tablets with no refills  Plan to establish care within the next month with a new primary care provider  Follow-up with primary care provider for further assessment and management    It is important to seek immediate medical attention if you are having symptoms of chest pain, fever, shortness of breath, palpitations, or any changes in your mental status, or new suicidal ideation      Prescription drug management  I spent a total of 17 minutes on the day of the visit.   Time spent by me doing chart review, history and exam, documentation and further activities per the note       FUTURE APPOINTMENTS:       - Follow-up visit in 1 month to establish care with a new primary care provider    NIKOS Dowd [facility]   Dasia is a 48 year old, presenting for the following health  issues:  Refill Request      1/10/2024     2:05 PM   Additional Questions   Roomed by AMADEO Limon       ADILENE Forman is a 48-year-old female with a past medical history significant for hyperhidrosis and anxiety who presents today for anxiety follow-up and medication refill.  Patient's anxiety is currently managed with Zoloft 50 mg daily.  She notes that her OB/GYN is to prescribe this medication to her, but recently retired, and when she went to refill the medication she was given a message that prompted her to follow-up in person.  She does not currently have established care with a primary care provider.  Patient notes that she has been on this dose of Zoloft for quite some time, and feels that it is largely helpful to manage her mood.  She reports that on days that she misses taking her medication she can notice increase in anxiety and irritability.  When she takes her medication daily, she feels that her mood is at a stable state.  She denies any concerns for depression or suicidal ideation.  Patient does feel that there is some possibility that an increase in her medication dosing may help to even further improve her anxiety, as she does have acute episodes of increased anxiety on her current dose.  Her concern with this increased dosing is that the Zoloft has historically always caused her to have significant heartburn.  She has tried a double dose on certain days, but feels that her heartburn is still out of control with the 100 mg dosing that she most often avoids this.  She notes that she does not have concerns with heartburn if she takes the medication on an empty stomach, but on days that she forgets to take the medication in the morning, she almost always has heartburn because she is forced to take it with food.  For this reason, patient would like to discuss other options for anxiety medications that may not cause her to have this side effect.  She denies any other concerning symptoms including chest pain,  shortness of breath, palpitations, numbness or tingling in her extremities, heat intolerance, or racing heart.    Anxiety Follow-Up  How are you doing with your anxiety since your last visit? No change  Are you having other symptoms that might be associated with anxiety? No  Have you had a significant life event? No   Are you feeling depressed? No  Do you have any concerns with your use of alcohol or other drugs? No    Social History     Tobacco Use    Smoking status: Former     Types: Cigarettes     Quit date: 2000     Years since quittin.3    Smokeless tobacco: Never   Vaping Use    Vaping Use: Never used   Substance Use Topics    Alcohol use: Yes     Comment: 5 drinks/week    Drug use: No         2022     1:14 PM 10/26/2022     7:08 PM   FRANCESCA-7 SCORE   Total Score  4 (minimal anxiety)   Total Score 11 4         2022     1:14 PM 10/27/2022     3:04 PM   PHQ   PHQ-9 Total Score 2 0   Q9: Thoughts of better off dead/self-harm past 2 weeks Not at all Not at all       Review of Systems   Constitutional, HEENT, cardiovascular, pulmonary, gi and gu systems are negative, except as otherwise noted.      Objective           Vitals:  No vitals were obtained today due to virtual visit.    Physical Exam   GENERAL: Healthy, alert and no distress  EYES: Eyes grossly normal to inspection.  No discharge or erythema, or obvious scleral/conjunctival abnormalities.  RESP: No audible wheeze, cough, or visible cyanosis.  No visible retractions or increased work of breathing.    SKIN: Visible skin clear. No significant rash, abnormal pigmentation or lesions.  NEURO: Cranial nerves grossly intact.  Mentation and speech appropriate for age.  PSYCH: Mentation appears normal, affect normal/bright, judgement and insight intact, normal speech and appearance well-groomed.    Jesica Penn DNP FNP-C  Family Nurse Practitioner - Same Day Provider  [facility]          Video-Visit Details    Type of  service:  Video Visit     Originating Location (pt. Location): Home    Distant Location (provider location):  On-site  Platform used for Video Visit: Chantell

## 2024-02-07 DIAGNOSIS — F41.1 GAD (GENERALIZED ANXIETY DISORDER): ICD-10-CM

## 2024-02-13 ENCOUNTER — OFFICE VISIT (OUTPATIENT)
Dept: FAMILY MEDICINE | Facility: CLINIC | Age: 49
End: 2024-02-13
Payer: COMMERCIAL

## 2024-02-13 VITALS
WEIGHT: 177.2 LBS | SYSTOLIC BLOOD PRESSURE: 116 MMHG | OXYGEN SATURATION: 98 % | BODY MASS INDEX: 28.48 KG/M2 | HEART RATE: 70 BPM | HEIGHT: 66 IN | TEMPERATURE: 98.2 F | DIASTOLIC BLOOD PRESSURE: 69 MMHG | RESPIRATION RATE: 20 BRPM

## 2024-02-13 DIAGNOSIS — E66.3 OVERWEIGHT (BMI 25.0-29.9): Primary | ICD-10-CM

## 2024-02-13 DIAGNOSIS — N95.9 MENOPAUSAL PROBLEM: ICD-10-CM

## 2024-02-13 DIAGNOSIS — G47.00 INSOMNIA, UNSPECIFIED TYPE: ICD-10-CM

## 2024-02-13 DIAGNOSIS — T14.8XXA BRUISING: ICD-10-CM

## 2024-02-13 DIAGNOSIS — F41.9 ANXIETY: ICD-10-CM

## 2024-02-13 DIAGNOSIS — Z13.220 LIPID SCREENING: ICD-10-CM

## 2024-02-13 LAB
ALBUMIN SERPL BCG-MCNC: 4.6 G/DL (ref 3.5–5.2)
ALP SERPL-CCNC: 56 U/L (ref 40–150)
ALT SERPL W P-5'-P-CCNC: 14 U/L (ref 0–50)
ANION GAP SERPL CALCULATED.3IONS-SCNC: 10 MMOL/L (ref 7–15)
AST SERPL W P-5'-P-CCNC: 24 U/L (ref 0–45)
BASOPHILS # BLD AUTO: 0 10E3/UL (ref 0–0.2)
BASOPHILS NFR BLD AUTO: 1 %
BILIRUB SERPL-MCNC: 0.5 MG/DL
BUN SERPL-MCNC: 10.7 MG/DL (ref 6–20)
CALCIUM SERPL-MCNC: 9.5 MG/DL (ref 8.6–10)
CHLORIDE SERPL-SCNC: 104 MMOL/L (ref 98–107)
CHOLEST SERPL-MCNC: 253 MG/DL
CREAT SERPL-MCNC: 0.8 MG/DL (ref 0.51–0.95)
DEPRECATED HCO3 PLAS-SCNC: 25 MMOL/L (ref 22–29)
EGFRCR SERPLBLD CKD-EPI 2021: >90 ML/MIN/1.73M2
EOSINOPHIL # BLD AUTO: 0.1 10E3/UL (ref 0–0.7)
EOSINOPHIL NFR BLD AUTO: 2 %
ERYTHROCYTE [DISTWIDTH] IN BLOOD BY AUTOMATED COUNT: 11.3 % (ref 10–15)
FASTING STATUS PATIENT QL REPORTED: YES
GLUCOSE SERPL-MCNC: 93 MG/DL (ref 70–99)
HCT VFR BLD AUTO: 39.3 % (ref 35–47)
HDLC SERPL-MCNC: 74 MG/DL
HGB BLD-MCNC: 13.2 G/DL (ref 11.7–15.7)
IMM GRANULOCYTES # BLD: 0 10E3/UL
IMM GRANULOCYTES NFR BLD: 0 %
LDLC SERPL CALC-MCNC: 165 MG/DL
LYMPHOCYTES # BLD AUTO: 1.4 10E3/UL (ref 0.8–5.3)
LYMPHOCYTES NFR BLD AUTO: 37 %
MCH RBC QN AUTO: 33.7 PG (ref 26.5–33)
MCHC RBC AUTO-ENTMCNC: 33.6 G/DL (ref 31.5–36.5)
MCV RBC AUTO: 100 FL (ref 78–100)
MONOCYTES # BLD AUTO: 0.4 10E3/UL (ref 0–1.3)
MONOCYTES NFR BLD AUTO: 10 %
NEUTROPHILS # BLD AUTO: 2 10E3/UL (ref 1.6–8.3)
NEUTROPHILS NFR BLD AUTO: 50 %
NONHDLC SERPL-MCNC: 179 MG/DL
PLATELET # BLD AUTO: 264 10E3/UL (ref 150–450)
POTASSIUM SERPL-SCNC: 4.9 MMOL/L (ref 3.4–5.3)
PROT SERPL-MCNC: 7.3 G/DL (ref 6.4–8.3)
RBC # BLD AUTO: 3.92 10E6/UL (ref 3.8–5.2)
SODIUM SERPL-SCNC: 139 MMOL/L (ref 135–145)
TRIGL SERPL-MCNC: 68 MG/DL
TSH SERPL DL<=0.005 MIU/L-ACNC: 3.72 UIU/ML (ref 0.3–4.2)
VIT D+METAB SERPL-MCNC: 28 NG/ML (ref 20–50)
WBC # BLD AUTO: 3.9 10E3/UL (ref 4–11)

## 2024-02-13 PROCEDURE — 80053 COMPREHEN METABOLIC PANEL: CPT | Performed by: NURSE PRACTITIONER

## 2024-02-13 PROCEDURE — 84443 ASSAY THYROID STIM HORMONE: CPT | Performed by: NURSE PRACTITIONER

## 2024-02-13 PROCEDURE — 36415 COLL VENOUS BLD VENIPUNCTURE: CPT | Performed by: NURSE PRACTITIONER

## 2024-02-13 PROCEDURE — 80061 LIPID PANEL: CPT | Performed by: NURSE PRACTITIONER

## 2024-02-13 PROCEDURE — 85025 COMPLETE CBC W/AUTO DIFF WBC: CPT | Performed by: NURSE PRACTITIONER

## 2024-02-13 PROCEDURE — 82306 VITAMIN D 25 HYDROXY: CPT | Performed by: NURSE PRACTITIONER

## 2024-02-13 PROCEDURE — 99214 OFFICE O/P EST MOD 30 MIN: CPT | Performed by: NURSE PRACTITIONER

## 2024-02-13 RX ORDER — ESCITALOPRAM OXALATE 10 MG/1
10 TABLET ORAL DAILY
Qty: 60 TABLET | Refills: 0 | Status: SHIPPED | OUTPATIENT
Start: 2024-02-13 | End: 2024-03-06

## 2024-02-13 RX ORDER — ZOLPIDEM TARTRATE 5 MG/1
2.5 TABLET ORAL
Qty: 20 TABLET | Refills: 0 | Status: SHIPPED | OUTPATIENT
Start: 2024-02-13

## 2024-02-13 SDOH — HEALTH STABILITY: PHYSICAL HEALTH: ON AVERAGE, HOW MANY DAYS PER WEEK DO YOU ENGAGE IN MODERATE TO STRENUOUS EXERCISE (LIKE A BRISK WALK)?: 4 DAYS

## 2024-02-13 ASSESSMENT — ANXIETY QUESTIONNAIRES
GAD7 TOTAL SCORE: 17
GAD7 TOTAL SCORE: 17
5. BEING SO RESTLESS THAT IT IS HARD TO SIT STILL: MORE THAN HALF THE DAYS
IF YOU CHECKED OFF ANY PROBLEMS ON THIS QUESTIONNAIRE, HOW DIFFICULT HAVE THESE PROBLEMS MADE IT FOR YOU TO DO YOUR WORK, TAKE CARE OF THINGS AT HOME, OR GET ALONG WITH OTHER PEOPLE: SOMEWHAT DIFFICULT
7. FEELING AFRAID AS IF SOMETHING AWFUL MIGHT HAPPEN: NOT AT ALL
6. BECOMING EASILY ANNOYED OR IRRITABLE: NEARLY EVERY DAY
2. NOT BEING ABLE TO STOP OR CONTROL WORRYING: NEARLY EVERY DAY
3. WORRYING TOO MUCH ABOUT DIFFERENT THINGS: NEARLY EVERY DAY
1. FEELING NERVOUS, ANXIOUS, OR ON EDGE: NEARLY EVERY DAY

## 2024-02-13 ASSESSMENT — PAIN SCALES - GENERAL: PAINLEVEL: MODERATE PAIN (5)

## 2024-02-13 ASSESSMENT — PATIENT HEALTH QUESTIONNAIRE - PHQ9
SUM OF ALL RESPONSES TO PHQ QUESTIONS 1-9: 3
5. POOR APPETITE OR OVEREATING: NEARLY EVERY DAY

## 2024-02-13 ASSESSMENT — SOCIAL DETERMINANTS OF HEALTH (SDOH): HOW OFTEN DO YOU GET TOGETHER WITH FRIENDS OR RELATIVES?: TWICE A WEEK

## 2024-02-13 NOTE — COMMUNITY RESOURCES LIST (ENGLISH)
02/13/2024   [facility]  N/A  For questions about this resource list or additional care needs, please contact your primary care clinic or care manager.  Phone: [phone]   Email: N/A   Address: [address] [place] [address]   Hours: N/A        Hotlines and Helplines       Hotline - Housing crisis  1  [facility] Distance: 8.25 miles      Phone/Virtual   [address] [place] [address]  Language: English  Hours: Mon - Sun Open 24 Hours   Phone: (945) 500-4204 Email: communications@[facility].org Website: https://[facility].org/[facility]avioursh[facility]/     2  [facility] Distance: 8.82 miles      Phone/Virtual   [address] [place] [address]  Language: English  Hours: Mon - Sun Open 24 Hours   Phone: (602) 102-3926 Email: info@[facility] Website: http://www.[facility].org          Housing       Coordinated Entry access point  3  [facility]  Office - [facility] Distance: 3.84 miles      Phone/Virtual   [address] [address]  Language: English  Hours: Mon - Fri 8:30 AM - 12:00 PM , Mon - Fri 1:00 PM - 4:00 PM  Fees: Free   Phone: (545) 783-4068 Ext.2 Email: [email or web address].[employer].org Website: https://[email or web address].org/usn/     4  [facility] ([facility] Distance: 8.39 miles      In-Person   [address] [place] [address]  Language: English  Hours: Mon - Fri 9:00 AM - 5:00 PM  Fees: Free   Phone: (549) 112-2762 Email: housing@[facility].org Website: http://www.[facility].org/housing     Drop-in center or day shelter  5  [facility] Distance: 6.99 miles      In-Person   [address] [place] [address]  Language: English, Hmong, Argentine, Tajik  Hours: Mon - Thu 8:30 AM - 4:30 PM , Sat - Sun 9:00 AM - 12:00 PM  Fees: Free   Phone: (438) 634-8837 Email: info@[employer].org Website:  https://[facility] Ridge[facility]hands.org/     6  [facility] Distance: 7.83 miles      In-Person   [address] [place] [address]  Language: English, Cuban, Papua [place]inean  Hours: Mon - Sat 7:00 AM - 3:00 PM  Fees: Free, Self Pay   Phone: (215) 820-5238 Email: info@[employer] Website: https://www.[employer]/locations/opportunity-center/     Housing search assistance  7  [facility] Distance: 3.72 miles      Phone/Virtual   [address] [place] [address]  Language: English, [place]inean  Hours: Mon - Fri 9:00 AM - 5:00 PM  Fees: Free   Phone: (277) 217-8589 Email: services@[employer] Website: https://www.[employer]     8  [facility] ([facility]) - [facility] Rental Housing Distance: 3.85 miles      In-Person, Phone/Virtual   [address] [address]  Language: English  Hours: Mon - Fri 8:00 AM - 4:30 PM  Fees: Free   Phone: (932) 184-9420 Email: accap@[facility].org Website: http://www.accap.org     Shelter for families  9  [facility] Distance: 12.84 miles      In-Person   [address] [place] [address]  Language: English  Hours: Mon - Fri 3:00 PM - 9:00 AM , Sat - Sun Open 24 Hours  Fees: Free   Phone: (232) 496-8146 Ext.1 Website: https://www.[facility].org/2020/07/03/emergency-family-shelter/     Shelter for individuals  10  [facility] Distance: 7.27 miles      In-Person   [address] [place] [address]  Language: English  Hours: Mon - Fri 4:00 PM - 9:00 AM  Fees: Free   Phone: (861) 582-2404 Email: brennan@[facility].org Website: https://[place].[facility].org/northern/[facility]/     11  [facility] Distance: 8.25 miles      In-Person   [address] [place] [address]  Language: English  Hours: Mon - Sun Open 24 Hours  Fees: Free   Phone: (030)  301-3003 Email: communications@[facility].org Website: https://[facility].org/oursaviourshousing/          Important Numbers & Websites       Emergency Services   911  [facility] Services   311  Poison Control   (523) 383-5720  Suicide Prevention Lifeline   (945) 326-8222 (TALK)  Child Abuse Hotline   (235) 366-6316 (4-A-Child)  Sexual Assault Hotline   (940) 169-7892 (HOPE)  National Runaway Safeline   (873) 738-7286 (RUNAWAY)  All-Options Talkline   (517) 768-1720  Substance Abuse Referral   (821) 312-4855 (HELP)

## 2024-02-13 NOTE — PROGRESS NOTES
Assessment & Plan     Overweight (BMI 25.0-29.9)  She is highly motivated to lose weight. Qsymia not worked well for her and she actually had weight gain from it. She would like to do an injectable option. Discussed options, risks. No personal or family history of thyroid cancer, nodules, etc. Baseline labs done today. She will follow-up in 5-6 weeks which will be about one month from starting Wegovy (due to prior auth process, anticipate she won't start for 1-2 weeks).     If Wegovy isn't covered by insurance or not available, discussed option of doing Zetbound. Side effects, risks and benefits of medication were discussed with patient. Discussed how and when to take medication. She would like to use that as a second option.    She has already done a nutritionist visit and doesn't feel she needs that repeated. She has a healthy diet and exercise regimen. Suspect a lot of the weight gain is due to perimenopause.     - Comprehensive metabolic panel (BMP + Alb, Alk Phos, ALT, AST, Total. Bili, TP)  - TSH with free T4 reflex  - Semaglutide-Weight Management (WEGOVY) 0.25 MG/0.5ML pen; Inject 0.25 mg Subcutaneous once a week    Anxiety  Zoloft not working well for her. States she still has a lot of anxiety. Discussed options. Lexapro prescribed. Side effects, risks and benefits of medication were discussed with patient. Discussed how and when to take medication. Follow-up in 6 weeks to discuss medication and weight loss.     - Comprehensive metabolic panel (BMP + Alb, Alk Phos, ALT, AST, Total. Bili, TP)  - Vitamin D Deficiency  - escitalopram (LEXAPRO) 10 MG tablet; Take 1 tablet (10 mg) by mouth daily    Insomnia, unspecified type  Ambien refilled. 2.5mg works well for her when she travels.     - Comprehensive metabolic panel (BMP + Alb, Alk Phos, ALT, AST, Total. Bili, TP)  - zolpidem (AMBIEN) 5 MG tablet; Take 0.5 tablets (2.5 mg) by mouth nightly as needed for sleep    Menopausal problem  More frequent, but  "tolerable. Discussed lifestyle things to adjust to help with symptoms. TSH check today.     - TSH with free T4 reflex    Bruising  Increase in this over the past month. States she isn't sure if it is because she is running into things, but she wanted to bring it up. Labs ordered.     - Comprehensive metabolic panel (BMP + Alb, Alk Phos, ALT, AST, Total. Bili, TP)  - CBC with platelets and differential    Lipid screening  Fasting labs done today.     - Lipid panel reflex to direct LDL Fasting    Ordering of each unique test  Prescription drug management  35+ minutes spent by me on the date of the encounter doing chart review, history and exam, documentation and further activities per the note      BMI  Estimated body mass index is 29.04 kg/m  as calculated from the following:    Height as of this encounter: 1.664 m (5' 5.5\").    Weight as of this encounter: 80.4 kg (177 lb 3.2 oz).   Weight management plan: Discussed healthy diet and exercise guidelines Wegovy prescribed.      Patient Instructions   Weight loss:   Wegovy injectable prescribed. As discussed, this can have side effects which include but are not limited to nausea, vomiting, diarrhea, constipation, abdominal pain, headache, dizziness, increased heart rate, heartburn, hair loss, rash, low blood pressure, and abnormal labs.  Pick a day of the week to do the injection.   Follow-up monthly while we are titrating the dose so we can discuss side effects, see how you are progressing, and do follow-up lab work.   It can take a week or two to get the medication initially due to insurance prior authorization process.   If Wegovy is not available or not covered by insurance, please let me know and we can switch to Zetbound as discussed.   While on any weight loss medication, it is important to also work on lifestyle changes to make your weight loss journey successful and long lasting.   Continue your healthy eating habits.  Continue exercising. The American Heart " Association recommends brisk walking 30 minutes 5 days a week. This will increase help keep your heart healthy, but it will also keep you from losing muscle mass while on the medication.   I would also recommend resistance training (small weights/bands) 2-3 days a week to help keep muscle tone.     Wean down off the zoloft taking 1/2 pill for the next week. Then go ahead and start the Lexapro daily.     Ambien refilled.     Labs ordered today.     Follow-up in 6 weeks to discuss weight loss and medication as well as anxiety and how you are doing on Lexapro.     Yi Forman is a 48 year old, presenting for the following health issues:  Menopausal Sx (Discuss symptoms ), Medication Request (Uses Zolpidem prn for travel once or twice a month and requesting a refill \ discuss possible weight loss options ), and Anxiety (Feels like her medication is not working as well and giving her heartburn often )        2024     8:11 AM   Additional Questions   Roomed by Melany   Accompanied by none         2024     8:11 AM   Patient Reported Additional Medications   Patient reports taking the following new medications Vitamin D, Pre,post and probiotic     Via the Health Maintenance questionnaire, the patient has reported the following services have been completed -Mammogram, this information has been sent to the abstraction team.  HPI       Anxiety Follow-Up  How are you doing with your anxiety since your last visit? Worsened   Are you having other symptoms that might be associated with anxiety? No  Have you had a significant life event? OTHER:      Are you feeling depressed? No  Do you have any concerns with your use of alcohol or other drugs? No    Social History     Tobacco Use    Smoking status: Former     Types: Cigarettes     Quit date: 2000     Years since quittin.4     Passive exposure: Never    Smokeless tobacco: Never   Vaping Use    Vaping Use: Never used   Substance Use Topics    Alcohol use: Yes      Comment: 5 drinks/week    Drug use: No         10/26/2022     7:08 PM 1/10/2024     3:03 PM 2/13/2024     8:22 AM   FRANCESCA-7 SCORE   Total Score 4 (minimal anxiety) 6 (mild anxiety)    Total Score 4 6 17         9/9/2022     1:14 PM 10/27/2022     3:04 PM 2/13/2024     8:22 AM   PHQ   PHQ-9 Total Score 2 0 3   Q9: Thoughts of better off dead/self-harm past 2 weeks Not at all Not at all Not at all         2/13/2024     8:22 AM   Last PHQ-9   1.  Little interest or pleasure in doing things 0   2.  Feeling down, depressed, or hopeless 0   3.  Trouble falling or staying asleep, or sleeping too much 3   4.  Feeling tired or having little energy 0   5.  Poor appetite or overeating 0   6.  Feeling bad about yourself 0   7.  Trouble concentrating 0   8.  Moving slowly or restless 0   Q9: Thoughts of better off dead/self-harm past 2 weeks 0   PHQ-9 Total Score 3   Difficulty at work, home, or with people Somewhat difficult         2/13/2024     8:22 AM   FRANCESCA-7    1. Feeling nervous, anxious, or on edge 3   2. Not being able to stop or control worrying 3   3. Worrying too much about different things 3   4. Trouble relaxing 3   5. Being so restless that it is hard to sit still 2   6. Becoming easily annoyed or irritable 3   7. Feeling afraid, as if something awful might happen 0   FRANCESCA-7 Total Score 17   If you checked any problems, how difficult have they made it for you to do your work, take care of things at home, or get along with other people? Somewhat difficult           10/26/2022     7:08 PM 1/10/2024     3:03 PM 2/13/2024     8:22 AM   FRANCESCA-7 SCORE   Total Score 4 (minimal anxiety) 6 (mild anxiety)    Total Score 4 6 17            9/9/2022     1:14 PM 10/27/2022     3:04 PM 2/13/2024     8:22 AM   PHQ   PHQ-9 Total Score 2 0 3   Q9: Thoughts of better off dead/self-harm past 2 weeks Not at all Not at all Not at all      Medication Followup of Ambien   Taking Medication as prescribed: yes  Side Effects:   "None  Medication Helping Symptoms:  yes    Concern - weight loss options   Patient has tried a medication qu  Discussed with a nutritionist   Wondering what options are out there     Discuss possible menopause symptoms, hot flashes which are disrupting her sleep   Patient has also been bruising more easily the past 30 days or so    Additional provider notes: Patient presents in clinic for annual physical and the following:    Insomnia: works well for her. She takes this when she travels. She travels about twice a month. She usually only takes 2.5mg.     Anxiety: feels the zoloft isn't working anymore. She gets \"crippling heartburn\" if she takes it with food. Does not have panic attacks. Anxiety is every day.     Weight loss: states she is up 20lbs since this past summer. She exercises and eats a lot of vegetables. She has talked with a nutritionist. She was started on Qsymia medication which made her gain the weight. Denies personal or family hx of thyroid cancer.     Menopause: night sweats occasionally. She has IUD in place and periods are irregular. Light spotting.     Increase in bruising: has noticed this for the past month. Denies blood in stools or coughing up blood. Denies abdominal pain.     Exercise: works out 3 days a week: treadmill, stair climber, weights.     Diet: eats a lot of vegetables, salmon    She does some intermittent fasting.       Allergies   Allergen Reactions    Codecon-C [Morphine And Related] Nausea and Vomiting       Current Outpatient Medications   Medication    MIRENA 20 MCG/24HR IU IUD    MULTI-VITAMIN OR TABS    sertraline (ZOLOFT) 50 MG tablet    zolpidem (AMBIEN) 5 MG tablet     No current facility-administered medications for this visit.       Past Medical History:   Diagnosis Date    Ovarian torsion             Review of Systems  Constitutional, HEENT, cardiovascular, pulmonary, gi and gu systems are negative, except as otherwise noted.      Objective    /69 (BP Location: " "Right arm, Patient Position: Sitting, Cuff Size: Adult Regular)   Pulse 70   Temp 98.2  F (36.8  C) (Oral)   Resp 20   Ht 1.664 m (5' 5.5\")   Wt 80.4 kg (177 lb 3.2 oz)   SpO2 98%   BMI 29.04 kg/m    Body mass index is 29.04 kg/m .  Physical Exam  Vitals reviewed.   Constitutional:       General: She is not in acute distress.     Appearance: Normal appearance. She is obese. She is not ill-appearing or toxic-appearing.   Neck:      Thyroid: No thyroid mass, thyromegaly or thyroid tenderness.   Cardiovascular:      Rate and Rhythm: Normal rate and regular rhythm.      Pulses: Normal pulses.      Heart sounds: Normal heart sounds.   Musculoskeletal:         General: Normal range of motion.      Cervical back: Normal range of motion and neck supple. No tenderness.   Lymphadenopathy:      Cervical: No cervical adenopathy.   Skin:     General: Skin is warm and dry.   Neurological:      Mental Status: She is alert and oriented to person, place, and time.   Psychiatric:         Behavior: Behavior normal.                    Signed Electronically by: Jenifer Huerta DNP    "

## 2024-02-13 NOTE — PATIENT INSTRUCTIONS
Weight loss:   Wegovy injectable prescribed. As discussed, this can have side effects which include but are not limited to nausea, vomiting, diarrhea, constipation, abdominal pain, headache, dizziness, increased heart rate, heartburn, hair loss, rash, low blood pressure, and abnormal labs.  Pick a day of the week to do the injection.   Follow-up monthly while we are titrating the dose so we can discuss side effects, see how you are progressing, and do follow-up lab work.   It can take a week or two to get the medication initially due to insurance prior authorization process.   If Wegovy is not available or not covered by insurance, please let me know and we can switch to Zetbound as discussed.   While on any weight loss medication, it is important to also work on lifestyle changes to make your weight loss journey successful and long lasting.   Continue your healthy eating habits.  Continue exercising. The American Heart Association recommends brisk walking 30 minutes 5 days a week. This will increase help keep your heart healthy, but it will also keep you from losing muscle mass while on the medication.   I would also recommend resistance training (small weights/bands) 2-3 days a week to help keep muscle tone.     Wean down off the zoloft taking 1/2 pill for the next week. Then go ahead and start the Lexapro daily.     Ambien refilled.     Labs ordered today.     Follow-up in 6 weeks to discuss weight loss and medication as well as anxiety and how you are doing on Lexapro.

## 2024-02-13 NOTE — COMMUNITY RESOURCES LIST (ENGLISH)
02/13/2024   [facility]  N/A  For questions about this resource list or additional care needs, please contact your primary care clinic or care manager.  Phone: [phone]   Email: N/A   Address: [address] [place] [id number]   Hours: N/A        Hotlines and Helplines       Hotline - Housing crisis  1  [facility] Distance: 8.25 miles      Phone/Virtual   [address] [place] [address]  Language: English  Hours: Mon - Sun Open 24 Hours   Phone: (956) 674-4004 Email: communications@[facility]-[place].org Website: https://[facility]-[place].org/oursaviourshousing/     2  [facility] Distance: 8.82 miles      Phone/Virtual   [address] [place] [address]  Language: English  Hours: Mon - Sun Open 24 Hours   Phone: (862) 336-4798 Email: info@[facility] Website: http://www.[facility].org          Housing       Coordinated Entry access point  3  [facility]  Office - [facility] Distance: 3.84 miles      Phone/Virtual   [address] [address]  Language: English  Hours: Mon - Fri 8:30 AM - 12:00 PM , Mon - Fri 1:00 PM - 4:00 PM  Fees: Free   Phone: (808) 427-1020 Ext.2 Email: shannon@[facility].[employer].org Website: https://www.[employer].org/usn/     4  [facility] [place] [facility] - [facility] Services Distance: 8.39 miles      In-Person   [address] [place] [address]  Language: English  Hours: Mon - Fri 9:00 AM - 5:00 PM  Fees: Free   Phone: (246) 250-4554 Email: housing@[facility].org Website: http://www.[facility].org/housing     Drop-in center or day shelter  5  [facility] Distance: 6.99 miles      In-Person   [address] [place] [address]  Language: English, Hmong, Panamanian, Telugu  Hours: Mon - Thu 8:30 AM - 4:30 PM , Sat - Sun 9:00 AM - 12:00 PM  Fees: Free   Phone: (565) 966-4682 Email: info@[employer].org Website:  https://sharingCritical access hospitalcaringhands.org/     6  [facility] Distance: 7.83 miles      In-Person   [address] [place] [address]  Language: English, Afghan, Kenyan  Hours: Mon - Sat 7:00 AM - 3:00 PM  Fees: Free, Self Pay   Phone: (667) 423-6928 Email: info@[facility] Website: https://www.[employer]/locations/opportunity-center/     Housing search assistance  7  [facility] [facility] [employer] Distance: 3.72 miles      Phone/Virtual   [address] [place] [address]  Language: English, Kenyan  Hours: Mon - Fri 9:00 AM - 5:00 PM  Fees: Free   Phone: (180) 455-6828 Email: services@[employer] Website: https://www.[employer]     8  [facility] ([facility] - [facility] Rental Housing Distance: 3.85 miles      In-Person, Phone/Virtual   [address] [address]  Language: English  Hours: Mon - Fri 8:00 AM - 4:30 PM  Fees: Free   Phone: (909) 696-3386 Email: accap@[facility].org Website: http://www.accap.org     Shelter for families  9  [facility] Distance: 12.84 miles      In-Person   [address] [place] [address]  Language: English  Hours: Mon - Fri 3:00 PM - 9:00 AM , Sat - Sun Open 24 Hours  Fees: Free   Phone: (521) 836-5225 Ext.1 Website: https://www.[facility].org/2020/07/03/emergency-family-shelter/     Shelter for individuals  10  [facility] Distance: 7.27 miles      In-Person   [address] [place] [address]  Language: English  Hours: Mon - Fri 4:00 PM - 9:00 AM  Fees: Free   Phone: (720) 258-5674 Email: brennan@[facility].org Website: https://centralusa.[facility].org/northern/[facility]/     11  [facility] Distance: 8.25 miles      In-Person   [address] [place] [address]  Language: English  Hours: Mon - Sun Open 24 Hours  Fees: Free   Phone: (844)  285-9731 Email: communications@[facility].org Website: https://[facility]-[place].org/oursaviourshousing/          Important Numbers & Websites       Emergency Services   911  [facility] Services   311  Poison Control   (638) 841-1054  Suicide Prevention Lifeline   (194) 537-6523 (TALK)  Child Abuse Hotline   (813) 371-5994 (4-A-Child)  Sexual Assault Hotline   (716) 523-9314 (HOPE)  National Runaway Safeline   (362) 778-5464 (RUNAWAY)  All-Options Talkline   (710) 337-2993  Substance Abuse Referral   (719) 171-1822 (HELP)

## 2024-03-06 DIAGNOSIS — F41.9 ANXIETY: ICD-10-CM

## 2024-03-06 RX ORDER — ESCITALOPRAM OXALATE 10 MG/1
10 TABLET ORAL DAILY
Qty: 90 TABLET | Refills: 0 | Status: SHIPPED | OUTPATIENT
Start: 2024-03-06 | End: 2024-03-14 | Stop reason: DRUGHIGH

## 2024-03-09 ASSESSMENT — ANXIETY QUESTIONNAIRES
GAD7 TOTAL SCORE: 7
1. FEELING NERVOUS, ANXIOUS, OR ON EDGE: NEARLY EVERY DAY
4. TROUBLE RELAXING: NOT AT ALL
5. BEING SO RESTLESS THAT IT IS HARD TO SIT STILL: NOT AT ALL
GAD7 TOTAL SCORE: 7
8. IF YOU CHECKED OFF ANY PROBLEMS, HOW DIFFICULT HAVE THESE MADE IT FOR YOU TO DO YOUR WORK, TAKE CARE OF THINGS AT HOME, OR GET ALONG WITH OTHER PEOPLE?: NOT DIFFICULT AT ALL
3. WORRYING TOO MUCH ABOUT DIFFERENT THINGS: SEVERAL DAYS
6. BECOMING EASILY ANNOYED OR IRRITABLE: SEVERAL DAYS
7. FEELING AFRAID AS IF SOMETHING AWFUL MIGHT HAPPEN: SEVERAL DAYS
7. FEELING AFRAID AS IF SOMETHING AWFUL MIGHT HAPPEN: SEVERAL DAYS
IF YOU CHECKED OFF ANY PROBLEMS ON THIS QUESTIONNAIRE, HOW DIFFICULT HAVE THESE PROBLEMS MADE IT FOR YOU TO DO YOUR WORK, TAKE CARE OF THINGS AT HOME, OR GET ALONG WITH OTHER PEOPLE: NOT DIFFICULT AT ALL
GAD7 TOTAL SCORE: 7
2. NOT BEING ABLE TO STOP OR CONTROL WORRYING: SEVERAL DAYS

## 2024-03-09 ASSESSMENT — PATIENT HEALTH QUESTIONNAIRE - PHQ9
SUM OF ALL RESPONSES TO PHQ QUESTIONS 1-9: 2
SUM OF ALL RESPONSES TO PHQ QUESTIONS 1-9: 2
10. IF YOU CHECKED OFF ANY PROBLEMS, HOW DIFFICULT HAVE THESE PROBLEMS MADE IT FOR YOU TO DO YOUR WORK, TAKE CARE OF THINGS AT HOME, OR GET ALONG WITH OTHER PEOPLE: NOT DIFFICULT AT ALL

## 2024-03-14 ENCOUNTER — OFFICE VISIT (OUTPATIENT)
Dept: FAMILY MEDICINE | Facility: CLINIC | Age: 49
End: 2024-03-14
Payer: COMMERCIAL

## 2024-03-14 VITALS
HEART RATE: 70 BPM | HEIGHT: 66 IN | RESPIRATION RATE: 18 BRPM | OXYGEN SATURATION: 96 % | BODY MASS INDEX: 27.64 KG/M2 | DIASTOLIC BLOOD PRESSURE: 77 MMHG | SYSTOLIC BLOOD PRESSURE: 121 MMHG | WEIGHT: 172 LBS | TEMPERATURE: 98.6 F

## 2024-03-14 DIAGNOSIS — F41.1 GAD (GENERALIZED ANXIETY DISORDER): ICD-10-CM

## 2024-03-14 DIAGNOSIS — E66.3 OVERWEIGHT (BMI 25.0-29.9): Primary | ICD-10-CM

## 2024-03-14 DIAGNOSIS — Z12.31 VISIT FOR SCREENING MAMMOGRAM: ICD-10-CM

## 2024-03-14 PROCEDURE — 99214 OFFICE O/P EST MOD 30 MIN: CPT | Performed by: NURSE PRACTITIONER

## 2024-03-14 RX ORDER — ESCITALOPRAM OXALATE 20 MG/1
20 TABLET ORAL DAILY
Qty: 90 TABLET | Refills: 3 | Status: SHIPPED | OUTPATIENT
Start: 2024-03-14

## 2024-03-14 ASSESSMENT — PAIN SCALES - GENERAL: PAINLEVEL: NO PAIN (0)

## 2024-03-14 NOTE — PROGRESS NOTES
Assessment & Plan     Overweight (BMI 25.0-29.9)  Stable on wegovy. 5lb weight loss. Increase to 0.5 mg. Follow-up in 1 month. Will do labs next visit.     - Semaglutide-Weight Management (WEGOVY) 0.5 MG/0.5ML pen; Inject 0.5 mg Subcutaneous once a week    FRANCESCA (generalized anxiety disorder)  Improved on lexapro 10mg. She would like to increase to 20mg.     - escitalopram (LEXAPRO) 20 MG tablet; Take 1 tablet (20 mg) by mouth daily    Visit for screening mammogram  Screening.     - MA SCREENING DIGITAL BILAT - Future  (s+30); Future    Prescription drug management          Patient Instructions   Medications refilled.     Follow-up in 1 month. Will order labs with next visit.     Yi Forman is a 48 year old, presenting for the following health issues:  Recheck Medication and Anxiety (Medication not working as well, would like to increase the medication )        3/14/2024     1:53 PM   Additional Questions   Roomed by Melany   Accompanied by none         3/14/2024     1:53 PM   Patient Reported Additional Medications   Patient reports taking the following new medications none     History of Present Illness       Mental Health Follow-up:  Patient presents to follow-up on Anxiety.    Patient's anxiety since last visit has been:  Good  The patient is not having other symptoms associated with anxiety.  Any significant life events: other  Patient is feeling anxious or having panic attacks.  Patient has no concerns about alcohol or drug use.    Reason for visit:  Weigh Management    She eats 0-1 servings of fruits and vegetables daily.She consumes 0 sweetened beverage(s) daily.She exercises with enough effort to increase her heart rate 10 to 19 minutes per day.  She exercises with enough effort to increase her heart rate 4 days per week.   She is taking medications regularly.          Anxiety     How are you doing with your anxiety since your last visit?  Worsened patient feels like the other medication worked better    Are you having other symptoms that might be associated with depression or anxiety? No  Have you had a significant life event? No   Do you have any concerns with your use of alcohol or other drugs? No    Social History     Tobacco Use    Smoking status: Former     Types: Cigarettes     Quit date: 2000     Years since quittin.5     Passive exposure: Never    Smokeless tobacco: Never   Vaping Use    Vaping Use: Never used   Substance Use Topics    Alcohol use: Yes     Comment: 5 drinks/week    Drug use: No         10/27/2022     3:04 PM 2024     8:22 AM 3/9/2024     8:30 AM   PHQ   PHQ-9 Total Score 0 3 2   Q9: Thoughts of better off dead/self-harm past 2 weeks Not at all Not at all Not at all         1/10/2024     3:03 PM 2024     8:22 AM 3/9/2024     8:31 AM   FRANCESCA-7 SCORE   Total Score 6 (mild anxiety)  7 (mild anxiety)   Total Score 6 17 7         3/9/2024     8:30 AM   Last PHQ-9   1.  Little interest or pleasure in doing things 0   2.  Feeling down, depressed, or hopeless 0   3.  Trouble falling or staying asleep, or sleeping too much 1   4.  Feeling tired or having little energy 1   5.  Poor appetite or overeating 0   6.  Feeling bad about yourself 0   7.  Trouble concentrating 0   8.  Moving slowly or restless 0   Q9: Thoughts of better off dead/self-harm past 2 weeks 0   PHQ-9 Total Score 2         3/9/2024     8:31 AM   FRANCESCA-7    1. Feeling nervous, anxious, or on edge 3   2. Not being able to stop or control worrying 1   3. Worrying too much about different things 1   4. Trouble relaxing 0   5. Being so restless that it is hard to sit still 0   6. Becoming easily annoyed or irritable 1   7. Feeling afraid, as if something awful might happen 1   FRANCESCA-7 Total Score 7   If you checked any problems, how difficult have they made it for you to do your work, take care of things at home, or get along with other people? Not difficult at all         10/27/2022     3:04 PM 2024     8:22 AM  "3/9/2024     8:30 AM   PHQ   PHQ-9 Total Score 0 3 2   Q9: Thoughts of better off dead/self-harm past 2 weeks Not at all Not at all Not at all            1/10/2024     3:03 PM 2/13/2024     8:22 AM 3/9/2024     8:31 AM   FRANCESCA-7 SCORE   Total Score 6 (mild anxiety)  7 (mild anxiety)   Total Score 6 17 7        Suicide Assessment Five-step Evaluation and Treatment (SAFE-T)      Medication Followup of Wegovy  Taking Medication as prescribed: yes  Side Effects:  None  Medication Helping Symptoms:  yes    Additional provider notes: Patient presents in clinic for follow-up.     Anxiety: doing better on the lexapro. Would like to increase dose as she feels there is room for improvement.     Weight loss: 5lbs in the past 3-4 weeks. No GI s/e or throat concerns. Labs were normal with last visit.     Allergies   Allergen Reactions    Codecon-C [Morphine And Related] Nausea and Vomiting       Current Outpatient Medications   Medication    escitalopram (LEXAPRO) 10 MG tablet    MIRENA 20 MCG/24HR IU IUD    MULTI-VITAMIN OR TABS    Semaglutide-Weight Management (WEGOVY) 0.25 MG/0.5ML pen    VITAMIN D, CHOLECALCIFEROL, PO    zolpidem (AMBIEN) 5 MG tablet     No current facility-administered medications for this visit.       Past Medical History:   Diagnosis Date    Ovarian torsion           Review of Systems  Constitutional, HEENT, cardiovascular, pulmonary, gi and gu systems are negative, except as otherwise noted.      Objective    /77 (BP Location: Right arm, Patient Position: Sitting, Cuff Size: Adult Regular)   Pulse 70   Temp 98.6  F (37  C) (Oral)   Resp 18   Ht 1.671 m (5' 5.78\")   Wt 78 kg (172 lb)   SpO2 96%   BMI 27.95 kg/m    Body mass index is 27.95 kg/m .  Physical Exam  Vitals reviewed.   Constitutional:       General: She is not in acute distress.     Appearance: Normal appearance. She is not ill-appearing or toxic-appearing.   Cardiovascular:      Rate and Rhythm: Normal rate and regular rhythm.      " Pulses: Normal pulses.      Heart sounds: Normal heart sounds.   Musculoskeletal:         General: Normal range of motion.   Skin:     General: Skin is warm and dry.   Neurological:      Mental Status: She is alert and oriented to person, place, and time.   Psychiatric:         Behavior: Behavior normal.                    Signed Electronically by: Jenifer Huerta DNP

## 2024-03-19 ENCOUNTER — LAB (OUTPATIENT)
Dept: LAB | Facility: CLINIC | Age: 49
End: 2024-03-19
Payer: COMMERCIAL

## 2024-03-19 ENCOUNTER — VIRTUAL VISIT (OUTPATIENT)
Dept: FAMILY MEDICINE | Facility: CLINIC | Age: 49
End: 2024-03-19
Payer: COMMERCIAL

## 2024-03-19 DIAGNOSIS — J06.9 UPPER RESPIRATORY TRACT INFECTION, UNSPECIFIED TYPE: Primary | ICD-10-CM

## 2024-03-19 DIAGNOSIS — J06.9 UPPER RESPIRATORY TRACT INFECTION, UNSPECIFIED TYPE: ICD-10-CM

## 2024-03-19 LAB
DEPRECATED S PYO AG THROAT QL EIA: NEGATIVE
FLUAV RNA SPEC QL NAA+PROBE: NEGATIVE
FLUBV RNA RESP QL NAA+PROBE: NEGATIVE
GROUP A STREP BY PCR: NOT DETECTED
RSV RNA SPEC NAA+PROBE: NEGATIVE
SARS-COV-2 RNA RESP QL NAA+PROBE: NEGATIVE

## 2024-03-19 PROCEDURE — 87637 SARSCOV2&INF A&B&RSV AMP PRB: CPT

## 2024-03-19 PROCEDURE — 99213 OFFICE O/P EST LOW 20 MIN: CPT | Mod: 95 | Performed by: STUDENT IN AN ORGANIZED HEALTH CARE EDUCATION/TRAINING PROGRAM

## 2024-03-19 PROCEDURE — 87651 STREP A DNA AMP PROBE: CPT | Performed by: STUDENT IN AN ORGANIZED HEALTH CARE EDUCATION/TRAINING PROGRAM

## 2024-03-19 NOTE — PROGRESS NOTES
Dasia is a 48 year old who is being evaluated via a billable video visit.    How would you like to obtain your AVS? MyChart  If the video visit is dropped, the invitation should be resent by: Text to cell phone: [phone]  Will anyone else be joining your video visit? No      Assessment & Plan     (J06.9) Upper respiratory tract infection, unspecified type  (primary encounter diagnosis)  Comment: Acute, uncontrolled. Recent history of travel. Did discuss with patient likely viral in nature due to abrupt onset however will test for possible sources of infection. OTC remedies discussed with patient. Pt scheduled for lab swabs for this morning. Will treat accordingly   Plan: Symptomatic Influenza A/B, RSV, & SARS-CoV2 PCR        (COVID-19) Nasopharyngeal, Streptococcus A         Rapid Screen w/Reflex to PCR - Clinic Collect            Dictation Disclaimer: Some of this Note has been completed with voice-recognition dictation software. Although errors are generally corrected real-time, there is the potential for a rare error to be present in the completed chart.                     Subjective   Dasia is a 48 year old, presenting for the following health issues:  Throat Problem    HPI     Acute Illness  Acute illness concerns: sore throat   Onset/Duration: Patient woke up this morning with a sore throat.   Symptoms:  Fever: No  Chills/Sweats: YES- chills.  Headache (location?): No  Sinus Pressure: No  Conjunctivitis:  No  Ear Pain: no  Rhinorrhea: No  Congestion: No  Sore Throat: YES. Very sore.  Cough: no  Wheeze: No  Decreased Appetite: No  Nausea: No  Vomiting: No  Diarrhea: No  Dysuria/Freq.: No  Dysuria or Hematuria: No  Fatigue/Achiness: YES- Fatigue.   Sick/Strep Exposure: No  Therapies tried and outcome: None    Patient is a 48-year-old female presenting for sore throat and earache.  She reports that her and her family just returned from [place] for a family vacation last night and she woke up this morning around 4  AM feeling pain in her throat.  She endorses associated ear ache as well.  She denies any individuals around her that are currently sick.  She denies trying any therapies at this time however is interested in being swabbed for causes of infection.  Patient denies any fevers however does endorse chills and fatigue.   ROS: 10 point ROS neg other than the symptoms noted above in the HPI.        Objective           Vitals:  No vitals were obtained today due to virtual visit.    Physical Exam   GENERAL: alert and no distress  EYES: Eyes grossly normal to inspection.  No discharge or erythema, or obvious scleral/conjunctival abnormalities.  RESP: No audible wheeze, cough, or visible cyanosis.    SKIN: Visible skin clear. No significant rash, abnormal pigmentation or lesions.  NEURO: Cranial nerves grossly intact.  Mentation and speech appropriate for age.  PSYCH: Appropriate affect, tone, and pace of words          Video-Visit Details    Type of service:  Video Visit   Originating Location (pt. Location): Home    Distant Location (provider location):  On-site  Platform used for Video Visit: Chantell  Signed Electronically by: MICHAEL COWAN MD

## 2024-03-22 ENCOUNTER — TELEPHONE (OUTPATIENT)
Dept: FAMILY MEDICINE | Facility: CLINIC | Age: 49
End: 2024-03-22

## 2024-03-22 DIAGNOSIS — E66.3 OVERWEIGHT (BMI 25.0-29.9): Primary | ICD-10-CM

## 2024-03-22 NOTE — TELEPHONE ENCOUNTER
Patient called.  Current Wegovy prescription is for 0.5 mg.  She stated that since Wegovy has been difficult to obtain due to backorder/shortage, she is wondering if we could proactively send a script in for the next dose and also the previous dose so that she has the option to go up or down on the dose depending on what is available next month.  She stated that she would not mind having to take a double dose of the 0.25 mg pens or even go back down to 0.25 mg weekly if it is the only dose available next month.  Did explain that insurance may not cover a double dose of the 0.25 mg pens  Patient verbalized understanding    Sean Higuera RN  [facility]-

## 2024-03-23 NOTE — TELEPHONE ENCOUNTER
Please let patient know that I sent a script for the next dose, but ask that she doesn't use it until she finishes the current script/dose. Unfortunately, the way the Wegovy trial went, we aren't able to go down or stay at the current dose (insurance won't cover it prescribed that way).     Hopefully they are in stock for her current dose and if not, please have her check other pharmacies and I can send it do a different pharmacy.     Thanks,  Jenifer Huerta, ASHLYE, NP-C

## 2024-03-25 NOTE — TELEPHONE ENCOUNTER
Called and spoke with patient to relay provider message.    Patient stated understanding of all information.    Christine M Klisch, RN

## 2024-04-21 ASSESSMENT — ANXIETY QUESTIONNAIRES
GAD7 TOTAL SCORE: 4
GAD7 TOTAL SCORE: 4
4. TROUBLE RELAXING: NOT AT ALL
5. BEING SO RESTLESS THAT IT IS HARD TO SIT STILL: NOT AT ALL
6. BECOMING EASILY ANNOYED OR IRRITABLE: SEVERAL DAYS
7. FEELING AFRAID AS IF SOMETHING AWFUL MIGHT HAPPEN: NOT AT ALL
IF YOU CHECKED OFF ANY PROBLEMS ON THIS QUESTIONNAIRE, HOW DIFFICULT HAVE THESE PROBLEMS MADE IT FOR YOU TO DO YOUR WORK, TAKE CARE OF THINGS AT HOME, OR GET ALONG WITH OTHER PEOPLE: NOT DIFFICULT AT ALL
1. FEELING NERVOUS, ANXIOUS, OR ON EDGE: SEVERAL DAYS
7. FEELING AFRAID AS IF SOMETHING AWFUL MIGHT HAPPEN: NOT AT ALL
2. NOT BEING ABLE TO STOP OR CONTROL WORRYING: SEVERAL DAYS
3. WORRYING TOO MUCH ABOUT DIFFERENT THINGS: SEVERAL DAYS
GAD7 TOTAL SCORE: 4
8. IF YOU CHECKED OFF ANY PROBLEMS, HOW DIFFICULT HAVE THESE MADE IT FOR YOU TO DO YOUR WORK, TAKE CARE OF THINGS AT HOME, OR GET ALONG WITH OTHER PEOPLE?: NOT DIFFICULT AT ALL

## 2024-04-23 ENCOUNTER — OFFICE VISIT (OUTPATIENT)
Dept: FAMILY MEDICINE | Facility: CLINIC | Age: 49
End: 2024-04-23
Payer: COMMERCIAL

## 2024-04-23 VITALS
HEIGHT: 66 IN | DIASTOLIC BLOOD PRESSURE: 70 MMHG | HEART RATE: 70 BPM | WEIGHT: 172.2 LBS | OXYGEN SATURATION: 98 % | TEMPERATURE: 98.6 F | RESPIRATION RATE: 18 BRPM | SYSTOLIC BLOOD PRESSURE: 111 MMHG | BODY MASS INDEX: 27.68 KG/M2

## 2024-04-23 DIAGNOSIS — E66.3 OVERWEIGHT (BMI 25.0-29.9): Primary | ICD-10-CM

## 2024-04-23 DIAGNOSIS — L29.9 ITCHING: ICD-10-CM

## 2024-04-23 LAB
ALBUMIN SERPL BCG-MCNC: 4.5 G/DL (ref 3.5–5.2)
ALP SERPL-CCNC: 56 U/L (ref 40–150)
ALT SERPL W P-5'-P-CCNC: 16 U/L (ref 0–50)
ANION GAP SERPL CALCULATED.3IONS-SCNC: 10 MMOL/L (ref 7–15)
AST SERPL W P-5'-P-CCNC: 23 U/L (ref 0–45)
BILIRUB SERPL-MCNC: 0.3 MG/DL
BUN SERPL-MCNC: 10.3 MG/DL (ref 6–20)
CALCIUM SERPL-MCNC: 9.4 MG/DL (ref 8.6–10)
CHLORIDE SERPL-SCNC: 102 MMOL/L (ref 98–107)
CREAT SERPL-MCNC: 0.77 MG/DL (ref 0.51–0.95)
DEPRECATED HCO3 PLAS-SCNC: 23 MMOL/L (ref 22–29)
EGFRCR SERPLBLD CKD-EPI 2021: >90 ML/MIN/1.73M2
GLUCOSE SERPL-MCNC: 87 MG/DL (ref 70–99)
LIPASE SERPL-CCNC: 31 U/L (ref 13–60)
POTASSIUM SERPL-SCNC: 4.7 MMOL/L (ref 3.4–5.3)
PROT SERPL-MCNC: 7.3 G/DL (ref 6.4–8.3)
SODIUM SERPL-SCNC: 135 MMOL/L (ref 135–145)

## 2024-04-23 PROCEDURE — 36415 COLL VENOUS BLD VENIPUNCTURE: CPT | Performed by: NURSE PRACTITIONER

## 2024-04-23 PROCEDURE — G2211 COMPLEX E/M VISIT ADD ON: HCPCS | Performed by: NURSE PRACTITIONER

## 2024-04-23 PROCEDURE — 99214 OFFICE O/P EST MOD 30 MIN: CPT | Performed by: NURSE PRACTITIONER

## 2024-04-23 PROCEDURE — 83690 ASSAY OF LIPASE: CPT | Performed by: NURSE PRACTITIONER

## 2024-04-23 PROCEDURE — 80053 COMPREHEN METABOLIC PANEL: CPT | Performed by: NURSE PRACTITIONER

## 2024-04-23 RX ORDER — LORATADINE 10 MG/1
10 TABLET ORAL DAILY
COMMUNITY
Start: 2024-04-23

## 2024-04-23 ASSESSMENT — PAIN SCALES - GENERAL: PAINLEVEL: NO PAIN (0)

## 2024-04-23 NOTE — LETTER
April 23, 2024      Dasia Reddy  [address] [id number]        To Whom It May Concern,     Dasia Reddy is seen at our clinic. She takes Wegovy injections which need to be refrigerated or accompanied by an ice pack while traveling.       Sincerely,        Jenifer Huerta DNP, NP-C

## 2024-04-23 NOTE — PATIENT INSTRUCTIONS
Wegovy dose increased. Continue with weekly injections.     Labs done today. Those results will be available on [employer] tomorrow.     Allergy referral placed.   -Start Claritin daily to see if it helps relieve the itching. It can take a couple days to a week to notice any difference.

## 2024-05-22 ENCOUNTER — OFFICE VISIT (OUTPATIENT)
Dept: FAMILY MEDICINE | Facility: CLINIC | Age: 49
End: 2024-05-22
Payer: COMMERCIAL

## 2024-05-22 ENCOUNTER — ANCILLARY PROCEDURE (OUTPATIENT)
Dept: GENERAL RADIOLOGY | Facility: CLINIC | Age: 49
End: 2024-05-22
Attending: PHYSICIAN ASSISTANT
Payer: COMMERCIAL

## 2024-05-22 VITALS
SYSTOLIC BLOOD PRESSURE: 109 MMHG | WEIGHT: 175.6 LBS | OXYGEN SATURATION: 94 % | HEIGHT: 66 IN | RESPIRATION RATE: 18 BRPM | BODY MASS INDEX: 28.22 KG/M2 | DIASTOLIC BLOOD PRESSURE: 74 MMHG | HEART RATE: 88 BPM | TEMPERATURE: 98.3 F

## 2024-05-22 DIAGNOSIS — R05.1 ACUTE COUGH: ICD-10-CM

## 2024-05-22 DIAGNOSIS — R05.1 ACUTE COUGH: Primary | ICD-10-CM

## 2024-05-22 PROCEDURE — 71046 X-RAY EXAM CHEST 2 VIEWS: CPT | Mod: TC | Performed by: RADIOLOGY

## 2024-05-22 PROCEDURE — 99213 OFFICE O/P EST LOW 20 MIN: CPT | Performed by: PHYSICIAN ASSISTANT

## 2024-05-22 RX ORDER — AZITHROMYCIN 250 MG/1
TABLET, FILM COATED ORAL
Qty: 6 TABLET | Refills: 0 | Status: SHIPPED | OUTPATIENT
Start: 2024-05-22 | End: 2024-05-27

## 2024-05-22 RX ORDER — BENZONATATE 100 MG/1
100 CAPSULE ORAL 3 TIMES DAILY PRN
Qty: 30 CAPSULE | Refills: 0 | Status: SHIPPED | OUTPATIENT
Start: 2024-05-22 | End: 2024-07-09

## 2024-05-22 ASSESSMENT — PAIN SCALES - GENERAL: PAINLEVEL: NO PAIN (1)

## 2024-05-22 NOTE — PROGRESS NOTES
Assessment & Plan     Acute cough  Chest xray was normal. Oxygen in clinic and lung exam was normal. Low suspicion for pneumonia. Discussed with patient this is likely a viral infection such as bronchitis and usually does not require an antibiotic. However due to longevity of symptoms and per patients request I did send in a zpack to see if this helps with symptoms. Also a cough suppressant to use PRN. In addition I recommend to get plenty of rest. Make sure you get plenty of fluids as well. Humidifier in the bedroom, tea with honey, and Mucinex can help with cough and congestion. If symptoms do not improve then can consider inhaler or prednisone. Patient agree's with this plan and has no further questions    - XR Chest 2 Views; Future  - azithromycin (ZITHROMAX) 250 MG tablet; Take 2 tablets (500 mg) by mouth daily for 1 day, THEN 1 tablet (250 mg) daily for 4 days.  - benzonatate (TESSALON) 100 MG capsule; Take 1 capsule (100 mg) by mouth 3 times daily as needed for cough            Yi Forman is a 48 year old, presenting for the following health issues:  Cough    History of Present Illness       Reason for visit:  Cough for 10+ days. Symptoms started about 5 days into a cruise where Legionella Disease was present and diagnosed several times this year.  Symptom onset:  3-4 weeks ago    She eats 4 or more servings of fruits and vegetables daily.She consumes 1 sweetened beverage(s) daily.She exercises with enough effort to increase her heart rate 10 to 19 minutes per day.  She exercises with enough effort to increase her heart rate 3 or less days per week.   She is taking medications regularly.     Acute Illness  Acute illness concerns: URI / cough  Onset/Duration: 3 weeks   Symptoms:  Fever: Felt warm  Chills/Sweats: YES  Headache (location?): YES- due to cough  Sinus Pressure: No  Conjunctivitis:  No  Ear Pain: no  Rhinorrhea: YES  Congestion: YES- but this has improved  Sore Throat: No  Cough: YES - raspy  "in chest, hurts to cough, breathing labor, no SOB, sit up at night, cough consistent through out the day, mostly dry, clear sputum   Wheeze: No  Decreased Appetite: No  Nausea: Yes    Vomiting: No  Diarrhea: No  Dysuria/Freq.: No  Dysuria or Hematuria: No  Fatigue/Achiness: YES  Sick/Strep Exposure: No  Therapies tried and outcome: dayquil, nyquil, rest   No underlying lung illness  No smoking  No hx of frequent lung disease as a child  No seasonal alleriges    Review of Systems  Constitutional, HEENT, cardiovascular, pulmonary, gi and gu systems are negative, except as otherwise noted.      Objective    /74   Pulse 88   Temp 98.3  F (36.8  C) (Oral)   Resp 18   Ht 1.671 m (5' 5.78\")   Wt 79.7 kg (175 lb 9.6 oz)   SpO2 94%   BMI 28.53 kg/m    Body mass index is 28.53 kg/m .  Physical Exam   GENERAL: alert and no distress  HENT: ear canals and TM's normal, nose and mouth without ulcers or lesions  NECK: no adenopathy, no asymmetry, masses, or scars  RESP: lungs clear to auscultation - no rales, rhonchi or wheezes  CV: regular rate and rhythm, normal S1 S2, no S3 or S4, no murmur, click or rub, no peripheral edema  ABDOMEN: soft, nontender, no hepatosplenomegaly, no masses and bowel sounds normal  MS: no gross musculoskeletal defects noted, no edema    CXR - Reviewed and interpreted by me Normal- no infiltrates, effusions, pneumothoraces, cardiomegaly or masses        Signed Electronically by: JEREMY Galeas    "

## 2024-06-19 ENCOUNTER — ANCILLARY PROCEDURE (OUTPATIENT)
Dept: MAMMOGRAPHY | Facility: CLINIC | Age: 49
End: 2024-06-19
Attending: NURSE PRACTITIONER
Payer: COMMERCIAL

## 2024-06-19 DIAGNOSIS — Z12.31 VISIT FOR SCREENING MAMMOGRAM: ICD-10-CM

## 2024-06-19 PROCEDURE — 77067 SCR MAMMO BI INCL CAD: CPT | Mod: TC | Performed by: RADIOLOGY

## 2024-06-19 PROCEDURE — 77063 BREAST TOMOSYNTHESIS BI: CPT | Mod: TC | Performed by: RADIOLOGY

## 2024-06-20 ENCOUNTER — TELEPHONE (OUTPATIENT)
Dept: FAMILY MEDICINE | Facility: CLINIC | Age: 49
End: 2024-06-20
Payer: COMMERCIAL

## 2024-06-20 DIAGNOSIS — E66.3 OVERWEIGHT (BMI 25.0-29.9): ICD-10-CM

## 2024-06-20 NOTE — TELEPHONE ENCOUNTER
Pt calling requesting refill, of the     Semaglutide-Weight Management (WEGOVY) 1.7 MG/0.75ML pen         Has an upcoming appt scheduled with provider , also patient would like to know  if she needs to have labs doen prior to her refill.      Please advise,

## 2024-06-24 NOTE — TELEPHONE ENCOUNTER
Patient calling to check status of message. Writer discussed that we are awaiting response from provider at this time.    Patient notes that she is tolerating the Wegovy 1.7 mg well, notes that she is currently out and is due for injection tomorrow. Patient is asking for a alistair refill until her appointment with PCP on 7/9/24?     Patient also asking if PCP would like her to complete any lab work prior to appointment on 7/9/24?    Pharmacy: [facility] [id number] [place] [facility] [address]     CB#: [phone], dvm ok    Thanks,  CACHORRO WongN RN  [facility]

## 2024-06-27 ENCOUNTER — ANCILLARY PROCEDURE (OUTPATIENT)
Dept: MAMMOGRAPHY | Facility: CLINIC | Age: 49
End: 2024-06-27
Attending: NURSE PRACTITIONER
Payer: COMMERCIAL

## 2024-06-27 DIAGNOSIS — R92.8 ABNORMAL MAMMOGRAM: ICD-10-CM

## 2024-06-27 PROCEDURE — 77065 DX MAMMO INCL CAD UNI: CPT | Mod: LT | Performed by: RADIOLOGY

## 2024-06-27 PROCEDURE — G0279 TOMOSYNTHESIS, MAMMO: HCPCS | Performed by: RADIOLOGY

## 2024-07-08 ASSESSMENT — ANXIETY QUESTIONNAIRES
IF YOU CHECKED OFF ANY PROBLEMS ON THIS QUESTIONNAIRE, HOW DIFFICULT HAVE THESE PROBLEMS MADE IT FOR YOU TO DO YOUR WORK, TAKE CARE OF THINGS AT HOME, OR GET ALONG WITH OTHER PEOPLE: NOT DIFFICULT AT ALL
GAD7 TOTAL SCORE: 7
GAD7 TOTAL SCORE: 7
3. WORRYING TOO MUCH ABOUT DIFFERENT THINGS: SEVERAL DAYS
7. FEELING AFRAID AS IF SOMETHING AWFUL MIGHT HAPPEN: NOT AT ALL
2. NOT BEING ABLE TO STOP OR CONTROL WORRYING: SEVERAL DAYS
8. IF YOU CHECKED OFF ANY PROBLEMS, HOW DIFFICULT HAVE THESE MADE IT FOR YOU TO DO YOUR WORK, TAKE CARE OF THINGS AT HOME, OR GET ALONG WITH OTHER PEOPLE?: NOT DIFFICULT AT ALL
6. BECOMING EASILY ANNOYED OR IRRITABLE: SEVERAL DAYS
1. FEELING NERVOUS, ANXIOUS, OR ON EDGE: NEARLY EVERY DAY
7. FEELING AFRAID AS IF SOMETHING AWFUL MIGHT HAPPEN: NOT AT ALL
4. TROUBLE RELAXING: SEVERAL DAYS
GAD7 TOTAL SCORE: 7
5. BEING SO RESTLESS THAT IT IS HARD TO SIT STILL: NOT AT ALL

## 2024-07-08 ASSESSMENT — PATIENT HEALTH QUESTIONNAIRE - PHQ9
10. IF YOU CHECKED OFF ANY PROBLEMS, HOW DIFFICULT HAVE THESE PROBLEMS MADE IT FOR YOU TO DO YOUR WORK, TAKE CARE OF THINGS AT HOME, OR GET ALONG WITH OTHER PEOPLE: NOT DIFFICULT AT ALL
SUM OF ALL RESPONSES TO PHQ QUESTIONS 1-9: 2
SUM OF ALL RESPONSES TO PHQ QUESTIONS 1-9: 2

## 2024-07-09 ENCOUNTER — OFFICE VISIT (OUTPATIENT)
Dept: FAMILY MEDICINE | Facility: CLINIC | Age: 49
End: 2024-07-09
Payer: COMMERCIAL

## 2024-07-09 VITALS
HEIGHT: 66 IN | DIASTOLIC BLOOD PRESSURE: 60 MMHG | RESPIRATION RATE: 14 BRPM | OXYGEN SATURATION: 98 % | HEART RATE: 81 BPM | TEMPERATURE: 98.4 F | WEIGHT: 169.4 LBS | BODY MASS INDEX: 27.23 KG/M2 | SYSTOLIC BLOOD PRESSURE: 97 MMHG

## 2024-07-09 DIAGNOSIS — E66.3 OVERWEIGHT (BMI 25.0-29.9): Primary | ICD-10-CM

## 2024-07-09 PROCEDURE — 99214 OFFICE O/P EST MOD 30 MIN: CPT | Performed by: NURSE PRACTITIONER

## 2024-07-09 ASSESSMENT — PAIN SCALES - GENERAL: PAINLEVEL: NO PAIN (0)

## 2024-07-09 NOTE — PROGRESS NOTES
Assessment & Plan     Overweight (BMI 25.0-29.9)  Doing well on Wegovy. No s/e. She would like to increase dose to next level. Follow-up in 3 months for refill and labs.     - Semaglutide-Weight Management (WEGOVY) 1.7 MG/0.75ML pen; Inject 1.7 mg subcutaneously once a week            Patient Instructions   Follow-up in 3 months.     Yi Forman is a 49 year old, presenting for the following health issues:  Recheck Medication        7/9/2024     2:18 PM   Additional Questions   Roomed by Melany   Accompanied by none         7/9/2024     2:18 PM   Patient Reported Additional Medications   Patient reports taking the following new medications none     History of Present Illness       Reason for visit:  Weanthonyove Check In    She eats 2-3 servings of fruits and vegetables daily.She consumes 1 sweetened beverage(s) daily.She exercises with enough effort to increase her heart rate 10 to 19 minutes per day.  She exercises with enough effort to increase her heart rate 3 or less days per week. She is missing 1 dose(s) of medications per week.  She is not taking prescribed medications regularly due to remembering to take.         Medication Followup of Марина  Taking Medication as prescribed: yes  Side Effects:  None  Medication Helping Symptoms:  yes    Additional provider notes: Patient presents in clinic for refill on wegovy. States she is doing well and ready to increase dose. No s/e or concerns.     Allergies   Allergen Reactions    Codecon-C [Morphine And Codeine] Nausea and Vomiting       Current Outpatient Medications   Medication Sig Dispense Refill    escitalopram (LEXAPRO) 20 MG tablet Take 1 tablet (20 mg) by mouth daily 90 tablet 3    loratadine (CLARITIN) 10 MG tablet Take 1 tablet (10 mg) by mouth daily      MIRENA 20 MCG/24HR IU IUD   0    MULTI-VITAMIN OR TABS 1 TABLET DAILY 30 0    Semaglutide-Weight Management (WEGOVY) 1.7 MG/0.75ML pen Inject 1.7 mg Subcutaneous once a week 3 mL 0    VITAMIN D,  "CHOLECALCIFEROL, PO Vitamin D      zolpidem (AMBIEN) 5 MG tablet Take 0.5 tablets (2.5 mg) by mouth nightly as needed for sleep 20 tablet 0     No current facility-administered medications for this visit.       Past Medical History:   Diagnosis Date    Ovarian torsion             Review of Systems  Constitutional, HEENT, cardiovascular, pulmonary, gi and gu systems are negative, except as otherwise noted.      Objective    BP 97/60 (BP Location: Right arm, Patient Position: Sitting, Cuff Size: Adult Regular)   Pulse 81   Temp 98.4  F (36.9  C) (Oral)   Resp 14   Ht 1.671 m (5' 5.78\")   Wt 76.8 kg (169 lb 6.4 oz)   SpO2 98%   BMI 27.53 kg/m    Body mass index is 27.53 kg/m .  Physical Exam  Vitals reviewed.   Constitutional:       Appearance: Normal appearance.   Cardiovascular:      Rate and Rhythm: Normal rate and regular rhythm.      Pulses: Normal pulses.      Heart sounds: Normal heart sounds.   Musculoskeletal:         General: Normal range of motion.   Skin:     General: Skin is warm and dry.   Neurological:      Mental Status: She is alert and oriented to person, place, and time.   Psychiatric:         Behavior: Behavior normal.                    Signed Electronically by: Jenifer Huerta DNP    "

## 2024-08-07 ENCOUNTER — TELEPHONE (OUTPATIENT)
Dept: FAMILY MEDICINE | Facility: CLINIC | Age: 49
End: 2024-08-07
Payer: COMMERCIAL

## 2024-08-07 NOTE — TELEPHONE ENCOUNTER
Forms/Letter Request    Type of form/letter: OTHER: [facility] Results       Do we have the form/letter: Yes: team silver mailbox    Who is the form from? Patient    Where did/will the form come from? Patient or family brought in       When is form/letter needed by: asap    How would you like the form/letter returned:     Patient Notified form requests are processed in 5-7 business days:Yes    Could we send this information to you in [employer] or would you prefer to receive a phone call?:   Patient would prefer a phone call   Okay to leave a detailed message?: Yes at Cell number on file:    Telephone Information:   Mobile [phone]

## 2024-08-12 ENCOUNTER — TELEPHONE (OUTPATIENT)
Dept: FAMILY MEDICINE | Facility: CLINIC | Age: 49
End: 2024-08-12
Payer: COMMERCIAL

## 2024-08-12 DIAGNOSIS — E66.3 OVERWEIGHT (BMI 25.0-29.9): Primary | ICD-10-CM

## 2024-08-12 NOTE — TELEPHONE ENCOUNTER
Jenifer Huerta completed form.    Called patient to see if she wanted me to fax to [employer].  Patient said yes to fax the form and email a copy of form to her at [email or web address]    Faxed form to [employer].    Emailed form to patient at [email or web address]    Placed in stat scanning.    FREDIS Farias  [facility]

## 2024-08-12 NOTE — TELEPHONE ENCOUNTER
I sent a script for the next dose up which is 2.4mg. Please ask her to keep her next appt with me and we'll check in then and do labs.     Thanks,  Jenifer Huerta, ASHLEY, NP-C

## 2024-08-12 NOTE — TELEPHONE ENCOUNTER
Patient stated she seems to have hit a plateau with her wegovy. She is wanting to go to the next dose up at 2.0. She stated she has had no side effects. She is willing to come in early for an appt than already scheduled (9/23/24) if needed.     Please advise if needed.     Detailed vm okay.     Cheyanne Dueñas RN on 8/12/2024 at 1:43 PM

## 2024-08-25 ENCOUNTER — HEALTH MAINTENANCE LETTER (OUTPATIENT)
Age: 49
End: 2024-08-25

## 2024-09-18 ASSESSMENT — ANXIETY QUESTIONNAIRES
8. IF YOU CHECKED OFF ANY PROBLEMS, HOW DIFFICULT HAVE THESE MADE IT FOR YOU TO DO YOUR WORK, TAKE CARE OF THINGS AT HOME, OR GET ALONG WITH OTHER PEOPLE?: NOT DIFFICULT AT ALL
GAD7 TOTAL SCORE: 2
7. FEELING AFRAID AS IF SOMETHING AWFUL MIGHT HAPPEN: NOT AT ALL

## 2024-09-23 ENCOUNTER — OFFICE VISIT (OUTPATIENT)
Dept: FAMILY MEDICINE | Facility: CLINIC | Age: 49
End: 2024-09-23
Payer: COMMERCIAL

## 2024-09-23 VITALS
SYSTOLIC BLOOD PRESSURE: 120 MMHG | DIASTOLIC BLOOD PRESSURE: 87 MMHG | HEART RATE: 72 BPM | BODY MASS INDEX: 26.65 KG/M2 | WEIGHT: 165.8 LBS | OXYGEN SATURATION: 98 % | HEIGHT: 66 IN | TEMPERATURE: 98.4 F | RESPIRATION RATE: 12 BRPM

## 2024-09-23 DIAGNOSIS — Z12.11 SCREEN FOR COLON CANCER: Primary | ICD-10-CM

## 2024-09-23 DIAGNOSIS — Z13.220 LIPID SCREENING: ICD-10-CM

## 2024-09-23 DIAGNOSIS — E66.3 OVERWEIGHT (BMI 25.0-29.9): ICD-10-CM

## 2024-09-23 LAB
ALBUMIN SERPL BCG-MCNC: 4.5 G/DL (ref 3.5–5.2)
ALP SERPL-CCNC: 68 U/L (ref 40–150)
ALT SERPL W P-5'-P-CCNC: 28 U/L (ref 0–50)
ANION GAP SERPL CALCULATED.3IONS-SCNC: 10 MMOL/L (ref 7–15)
AST SERPL W P-5'-P-CCNC: 36 U/L (ref 0–45)
BILIRUB SERPL-MCNC: 0.5 MG/DL
BUN SERPL-MCNC: 10.2 MG/DL (ref 6–20)
CALCIUM SERPL-MCNC: 9.2 MG/DL (ref 8.8–10.4)
CHLORIDE SERPL-SCNC: 103 MMOL/L (ref 98–107)
CHOLEST SERPL-MCNC: 222 MG/DL
CREAT SERPL-MCNC: 0.9 MG/DL (ref 0.51–0.95)
EGFRCR SERPLBLD CKD-EPI 2021: 78 ML/MIN/1.73M2
FASTING STATUS PATIENT QL REPORTED: YES
FASTING STATUS PATIENT QL REPORTED: YES
GLUCOSE SERPL-MCNC: 91 MG/DL (ref 70–99)
HCO3 SERPL-SCNC: 25 MMOL/L (ref 22–29)
HDLC SERPL-MCNC: 73 MG/DL
LDLC SERPL CALC-MCNC: 125 MG/DL
LIPASE SERPL-CCNC: 28 U/L (ref 13–60)
NONHDLC SERPL-MCNC: 149 MG/DL
POTASSIUM SERPL-SCNC: 4.8 MMOL/L (ref 3.4–5.3)
PROT SERPL-MCNC: 7.4 G/DL (ref 6.4–8.3)
SODIUM SERPL-SCNC: 138 MMOL/L (ref 135–145)
TRIGL SERPL-MCNC: 121 MG/DL

## 2024-09-23 PROCEDURE — 36415 COLL VENOUS BLD VENIPUNCTURE: CPT | Performed by: NURSE PRACTITIONER

## 2024-09-23 PROCEDURE — 99213 OFFICE O/P EST LOW 20 MIN: CPT | Performed by: NURSE PRACTITIONER

## 2024-09-23 PROCEDURE — 80053 COMPREHEN METABOLIC PANEL: CPT | Performed by: NURSE PRACTITIONER

## 2024-09-23 PROCEDURE — 83690 ASSAY OF LIPASE: CPT | Performed by: NURSE PRACTITIONER

## 2024-09-23 PROCEDURE — 80061 LIPID PANEL: CPT | Performed by: NURSE PRACTITIONER

## 2024-09-23 ASSESSMENT — PATIENT HEALTH QUESTIONNAIRE - PHQ9
10. IF YOU CHECKED OFF ANY PROBLEMS, HOW DIFFICULT HAVE THESE PROBLEMS MADE IT FOR YOU TO DO YOUR WORK, TAKE CARE OF THINGS AT HOME, OR GET ALONG WITH OTHER PEOPLE: NOT DIFFICULT AT ALL
SUM OF ALL RESPONSES TO PHQ QUESTIONS 1-9: 1
SUM OF ALL RESPONSES TO PHQ QUESTIONS 1-9: 1

## 2024-09-23 ASSESSMENT — PAIN SCALES - GENERAL: PAINLEVEL: NO PAIN (0)

## 2024-09-23 NOTE — PROGRESS NOTES
"Assessment & Plan     Overweight (BMI 25.0-29.9)  Doing well on Wegovy. No side effects. She would like to remain on current dose. Rechecking labs today. Follow-up 6 months for refills.    - Semaglutide-Weight Management (WEGOVY) 2.4 MG/0.75ML pen; Inject 2.4 mg subcutaneously once a week.  - Lipase; Future  - Comprehensive metabolic panel (BMP + Alb, Alk Phos, ALT, AST, Total. Bili, TP); Future  - Lipase  - Comprehensive metabolic panel (BMP + Alb, Alk Phos, ALT, AST, Total. Bili, TP)    Lipid screening  Screening. She is fasting.     - Lipid panel reflex to direct LDL Fasting; Future  - Lipid panel reflex to direct LDL Fasting      BMI  Estimated body mass index is 26.94 kg/m  as calculated from the following:    Height as of this encounter: 1.671 m (5' 5.78\").    Weight as of this encounter: 75.2 kg (165 lb 12.8 oz).   Weight management plan: Discussed healthy diet and exercise guidelines      See Patient Instructions    Yi Forman is a 49 year old, presenting for the following health issues:  Recheck Medication        9/23/2024     8:42 AM   Additional Questions   Roomed by Melany   Accompanied by none         9/23/2024     8:42 AM   Patient Reported Additional Medications   Patient reports taking the following new medications none     History of Present Illness       Mental Health Follow-up:  Patient presents to follow-up on Anxiety.    Patient's anxiety since last visit has been:  Good  The patient is not having other symptoms associated with anxiety.  Any significant life events: No  Patient is not feeling anxious or having panic attacks.  Patient has no concerns about alcohol or drug use.    Hyperlipidemia:  She presents for follow up of hyperlipidemia.   She is not taking medication to lower cholesterol. She is not having myalgia or other side effects to statin medications.    She eats 2-3 servings of fruits and vegetables daily.She consumes 0 sweetened beverage(s) daily.She exercises with enough " "effort to increase her heart rate 9 or less minutes per day.  She exercises with enough effort to increase her heart rate 3 or less days per week. She is missing 1 dose(s) of medications per week.  She is not taking prescribed medications regularly due to remembering to take.         Medication Followup of Wegovy 2.4 once a week   Taking Medication as prescribed: yes  Side Effects:  None  Medication Helping Symptoms:  yes      Additional provider notes: Patient presents in clinic for refill on wegovy. States she is doing well and would like to remain on current dose of 2.4 mg. Continues to work on diet, has increased protein intake. She states she has fallen off with the physical activity recently which she thinks is why she only had a 5lb weight loss since last visit.     Allergies   Allergen Reactions    Codecon-C [Morphine And Codeine] Nausea and Vomiting       Current Outpatient Medications   Medication Sig Dispense Refill    escitalopram (LEXAPRO) 20 MG tablet Take 1 tablet (20 mg) by mouth daily 90 tablet 3    loratadine (CLARITIN) 10 MG tablet Take 1 tablet (10 mg) by mouth daily      MIRENA 20 MCG/24HR IU IUD   0    MULTI-VITAMIN OR TABS 1 TABLET DAILY 30 0    VITAMIN D, CHOLECALCIFEROL, PO Vitamin D      zolpidem (AMBIEN) 5 MG tablet Take 0.5 tablets (2.5 mg) by mouth nightly as needed for sleep 20 tablet 0    Semaglutide-Weight Management (WEGOVY) 2.4 MG/0.75ML pen Inject 2.4 mg subcutaneously once a week (Patient not taking: Reported on 9/23/2024) 3 mL 3     No current facility-administered medications for this visit.       Past Medical History:   Diagnosis Date    Ovarian torsion          Review of Systems  Constitutional, HEENT, cardiovascular, pulmonary, gi and gu systems are negative, except as otherwise noted.      Objective    /87 (BP Location: Right arm, Patient Position: Sitting, Cuff Size: Adult Regular)   Pulse 72   Temp 98.4  F (36.9  C) (Oral)   Resp 12   Ht 1.671 m (5' 5.78\")   Wt " 75.2 kg (165 lb 12.8 oz)   SpO2 98%   BMI 26.94 kg/m    Body mass index is 26.94 kg/m .  Physical Exam  Vitals reviewed.   Constitutional:       General: She is not in acute distress.     Appearance: Normal appearance. She is not ill-appearing or toxic-appearing.   Cardiovascular:      Rate and Rhythm: Normal rate and regular rhythm.      Pulses: Normal pulses.      Heart sounds: Normal heart sounds.   Musculoskeletal:         General: Normal range of motion.   Skin:     General: Skin is warm and dry.   Neurological:      Mental Status: She is alert and oriented to person, place, and time.   Psychiatric:         Behavior: Behavior normal.            Office Visit on 04/23/2024   Component Date Value Ref Range Status    Sodium 04/23/2024 135  135 - 145 mmol/L Final    Reference intervals for this test were updated on 09/26/2023 to more accurately reflect our healthy population. There may be differences in the flagging of prior results with similar values performed with this method. Interpretation of those prior results can be made in the context of the updated reference intervals.     Potassium 04/23/2024 4.7  3.4 - 5.3 mmol/L Final    Carbon Dioxide (CO2) 04/23/2024 23  22 - 29 mmol/L Final    Anion Gap 04/23/2024 10  7 - 15 mmol/L Final    Urea Nitrogen 04/23/2024 10.3  6.0 - 20.0 mg/dL Final    Creatinine 04/23/2024 0.77  0.51 - 0.95 mg/dL Final    GFR Estimate 04/23/2024 >90  >60 mL/min/1.73m2 Final    Calcium 04/23/2024 9.4  8.6 - 10.0 mg/dL Final    Chloride 04/23/2024 102  98 - 107 mmol/L Final    Glucose 04/23/2024 87  70 - 99 mg/dL Final    Alkaline Phosphatase 04/23/2024 56  40 - 150 U/L Final    Reference intervals for this test were updated on 11/14/2023 to more accurately reflect our healthy population. There may be differences in the flagging of prior results with similar values performed with this method. Interpretation of those prior results can be made in the context of the updated reference  intervals.    AST 04/23/2024 23  0 - 45 U/L Final    Reference intervals for this test were updated on 6/12/2023 to more accurately reflect our healthy population. There may be differences in the flagging of prior results with similar values performed with this method. Interpretation of those prior results can be made in the context of the updated reference intervals.    ALT 04/23/2024 16  0 - 50 U/L Final    Reference intervals for this test were updated on 6/12/2023 to more accurately reflect our healthy population. There may be differences in the flagging of prior results with similar values performed with this method. Interpretation of those prior results can be made in the context of the updated reference intervals.      Protein Total 04/23/2024 7.3  6.4 - 8.3 g/dL Final    Albumin 04/23/2024 4.5  3.5 - 5.2 g/dL Final    Bilirubin Total 04/23/2024 0.3  <=1.2 mg/dL Final    Lipase 04/23/2024 31  13 - 60 U/L Final         I was present with the student who participated in the service and in the documentation of the note.  I have verified the history and personally performed the physical exam and medical decision-making. I agree with the assessment and plan as documented in the note.    Jenifer Huerta DNP, NP-C     Signed Electronically by: Jenifer Huerta DNP

## 2024-09-30 ENCOUNTER — TELEPHONE (OUTPATIENT)
Dept: FAMILY MEDICINE | Facility: CLINIC | Age: 49
End: 2024-09-30
Payer: COMMERCIAL

## 2024-09-30 NOTE — TELEPHONE ENCOUNTER
Pt returned call to clinic, she will call the pharmacy to check if addition pen can be released due to travel        She will call back If needed.      Michelle, RN    Triage Nurse  [facility]

## 2024-09-30 NOTE — TELEPHONE ENCOUNTER
Medication Question or Refill        What medication are you calling about (include dose and sig)?:   Semaglutide-Weight Management (WEGOVY) 2.4 MG/0.75ML pen     Preferred Pharmacy:       [facility] [address] [facility] [place] [id number] [facility] [address]  Phone: [phone] Fax: [phone]      Controlled Substance Agreement on file:   CSA -- Patient Level:    CSA: None found at the patient level.       Who prescribed the medication?:  Jenifer Huerta    Do you need a refill? Yes    When did you use the medication last?  Last week, Tuesday    Patient offered an appointment? No    Do you have any questions or concerns?  Yes: Patient states she will going out of town, and would need a refill sooner than later. Please send in refill no later than Wednesday this week 10/ 02      Could we send this information to you in [employer] or would you prefer to receive a phone call?:   Patient would prefer a phone call   Okay to leave a detailed message?: Yes at Cell number on file:    Telephone Information:   Mobile [phone]

## 2024-09-30 NOTE — TELEPHONE ENCOUNTER
Attempt#1    Left message for patient to call [facility] at [phone].    Foster Adams RN

## 2024-09-30 NOTE — TELEPHONE ENCOUNTER
Please have patient check with pharmacy as I sent 3 refills when I ordered the Wegovy on 9/23/24. If it's a matter of getting it early due to traveling, the pharmacy should be able to do an early release for travels reasons. Please have her reach back out if there are any issues with the pharmacy not having the refills on file for some reason.     Thanks,  Jenifer Huerta DNP, NP-C

## 2024-12-18 ENCOUNTER — TELEPHONE (OUTPATIENT)
Dept: FAMILY MEDICINE | Facility: CLINIC | Age: 49
End: 2024-12-18
Payer: COMMERCIAL

## 2024-12-18 NOTE — TELEPHONE ENCOUNTER
Patient calling    She is wanting a refill for her wegovy    RN let her know that she should still have one refill. She will call her pharmacy to double check and let us know if they don't    Izabella Chiang RN

## 2025-02-21 ENCOUNTER — ORDERS ONLY (AUTO-RELEASED) (OUTPATIENT)
Dept: FAMILY MEDICINE | Facility: CLINIC | Age: 50
End: 2025-02-21

## 2025-02-21 DIAGNOSIS — Z12.11 SCREEN FOR COLON CANCER: ICD-10-CM

## 2025-05-28 ENCOUNTER — PATIENT OUTREACH (OUTPATIENT)
Dept: CARE COORDINATION | Facility: CLINIC | Age: 50
End: 2025-05-28
Payer: COMMERCIAL

## 2025-07-14 ENCOUNTER — OFFICE VISIT (OUTPATIENT)
Dept: FAMILY MEDICINE | Facility: CLINIC | Age: 50
End: 2025-07-14
Attending: NURSE PRACTITIONER
Payer: COMMERCIAL

## 2025-07-14 VITALS
HEART RATE: 70 BPM | WEIGHT: 164 LBS | OXYGEN SATURATION: 97 % | HEIGHT: 66 IN | RESPIRATION RATE: 12 BRPM | TEMPERATURE: 98.1 F | SYSTOLIC BLOOD PRESSURE: 112 MMHG | BODY MASS INDEX: 26.36 KG/M2 | DIASTOLIC BLOOD PRESSURE: 75 MMHG

## 2025-07-14 DIAGNOSIS — F41.1 GAD (GENERALIZED ANXIETY DISORDER): ICD-10-CM

## 2025-07-14 DIAGNOSIS — Z12.31 VISIT FOR SCREENING MAMMOGRAM: ICD-10-CM

## 2025-07-14 DIAGNOSIS — E78.5 HYPERLIPIDEMIA LDL GOAL <100: ICD-10-CM

## 2025-07-14 DIAGNOSIS — E66.3 OVERWEIGHT (BMI 25.0-29.9): ICD-10-CM

## 2025-07-14 DIAGNOSIS — G47.00 INSOMNIA, UNSPECIFIED TYPE: ICD-10-CM

## 2025-07-14 DIAGNOSIS — Z00.00 ROUTINE GENERAL MEDICAL EXAMINATION AT A HEALTH CARE FACILITY: Primary | ICD-10-CM

## 2025-07-14 LAB
ALBUMIN SERPL BCG-MCNC: 4.3 G/DL (ref 3.5–5.2)
ALP SERPL-CCNC: 66 U/L (ref 40–150)
ALT SERPL W P-5'-P-CCNC: 20 U/L (ref 0–50)
ANION GAP SERPL CALCULATED.3IONS-SCNC: 9 MMOL/L (ref 7–15)
AST SERPL W P-5'-P-CCNC: 29 U/L (ref 0–45)
BILIRUB SERPL-MCNC: 0.5 MG/DL
BUN SERPL-MCNC: 9.9 MG/DL (ref 6–20)
CALCIUM SERPL-MCNC: 9.5 MG/DL (ref 8.8–10.4)
CHLORIDE SERPL-SCNC: 104 MMOL/L (ref 98–107)
CHOLEST SERPL-MCNC: 231 MG/DL
CREAT SERPL-MCNC: 0.78 MG/DL (ref 0.51–0.95)
EGFRCR SERPLBLD CKD-EPI 2021: >90 ML/MIN/1.73M2
FASTING STATUS PATIENT QL REPORTED: YES
FASTING STATUS PATIENT QL REPORTED: YES
GLUCOSE SERPL-MCNC: 87 MG/DL (ref 70–99)
HCO3 SERPL-SCNC: 24 MMOL/L (ref 22–29)
HDLC SERPL-MCNC: 77 MG/DL
LDLC SERPL CALC-MCNC: 137 MG/DL
NONHDLC SERPL-MCNC: 154 MG/DL
POTASSIUM SERPL-SCNC: 4.5 MMOL/L (ref 3.4–5.3)
PROT SERPL-MCNC: 7.1 G/DL (ref 6.4–8.3)
SODIUM SERPL-SCNC: 137 MMOL/L (ref 135–145)
TRIGL SERPL-MCNC: 84 MG/DL

## 2025-07-14 PROCEDURE — 99396 PREV VISIT EST AGE 40-64: CPT | Performed by: NURSE PRACTITIONER

## 2025-07-14 PROCEDURE — G2211 COMPLEX E/M VISIT ADD ON: HCPCS | Performed by: NURSE PRACTITIONER

## 2025-07-14 PROCEDURE — 80061 LIPID PANEL: CPT | Performed by: NURSE PRACTITIONER

## 2025-07-14 PROCEDURE — 36415 COLL VENOUS BLD VENIPUNCTURE: CPT | Performed by: NURSE PRACTITIONER

## 2025-07-14 PROCEDURE — 1126F AMNT PAIN NOTED NONE PRSNT: CPT | Performed by: NURSE PRACTITIONER

## 2025-07-14 PROCEDURE — 96127 BRIEF EMOTIONAL/BEHAV ASSMT: CPT | Performed by: NURSE PRACTITIONER

## 2025-07-14 PROCEDURE — 3074F SYST BP LT 130 MM HG: CPT | Performed by: NURSE PRACTITIONER

## 2025-07-14 PROCEDURE — 3078F DIAST BP <80 MM HG: CPT | Performed by: NURSE PRACTITIONER

## 2025-07-14 PROCEDURE — 99214 OFFICE O/P EST MOD 30 MIN: CPT | Mod: 25 | Performed by: NURSE PRACTITIONER

## 2025-07-14 PROCEDURE — 80053 COMPREHEN METABOLIC PANEL: CPT | Performed by: NURSE PRACTITIONER

## 2025-07-14 RX ORDER — ESCITALOPRAM OXALATE 20 MG/1
20 TABLET ORAL DAILY
Qty: 90 TABLET | Refills: 3 | Status: SHIPPED | OUTPATIENT
Start: 2025-07-14

## 2025-07-14 RX ORDER — BUSPIRONE HYDROCHLORIDE 5 MG/1
5 TABLET ORAL 2 TIMES DAILY PRN
Qty: 60 TABLET | Refills: 3 | Status: SHIPPED | OUTPATIENT
Start: 2025-07-14

## 2025-07-14 RX ORDER — ZOLPIDEM TARTRATE 5 MG/1
2.5 TABLET ORAL
Qty: 20 TABLET | Refills: 0 | Status: SHIPPED | OUTPATIENT
Start: 2025-07-14

## 2025-07-14 SDOH — HEALTH STABILITY: PHYSICAL HEALTH: ON AVERAGE, HOW MANY DAYS PER WEEK DO YOU ENGAGE IN MODERATE TO STRENUOUS EXERCISE (LIKE A BRISK WALK)?: 2 DAYS

## 2025-07-14 ASSESSMENT — ANXIETY QUESTIONNAIRES
GAD7 TOTAL SCORE: 1
4. TROUBLE RELAXING: NOT AT ALL
7. FEELING AFRAID AS IF SOMETHING AWFUL MIGHT HAPPEN: NOT AT ALL
8. IF YOU CHECKED OFF ANY PROBLEMS, HOW DIFFICULT HAVE THESE MADE IT FOR YOU TO DO YOUR WORK, TAKE CARE OF THINGS AT HOME, OR GET ALONG WITH OTHER PEOPLE?: NOT DIFFICULT AT ALL
7. FEELING AFRAID AS IF SOMETHING AWFUL MIGHT HAPPEN: NOT AT ALL
3. WORRYING TOO MUCH ABOUT DIFFERENT THINGS: NOT AT ALL
GAD7 TOTAL SCORE: 1
6. BECOMING EASILY ANNOYED OR IRRITABLE: NOT AT ALL
GAD7 TOTAL SCORE: 1
1. FEELING NERVOUS, ANXIOUS, OR ON EDGE: SEVERAL DAYS
IF YOU CHECKED OFF ANY PROBLEMS ON THIS QUESTIONNAIRE, HOW DIFFICULT HAVE THESE PROBLEMS MADE IT FOR YOU TO DO YOUR WORK, TAKE CARE OF THINGS AT HOME, OR GET ALONG WITH OTHER PEOPLE: NOT DIFFICULT AT ALL
5. BEING SO RESTLESS THAT IT IS HARD TO SIT STILL: NOT AT ALL
2. NOT BEING ABLE TO STOP OR CONTROL WORRYING: NOT AT ALL

## 2025-07-14 ASSESSMENT — PATIENT HEALTH QUESTIONNAIRE - PHQ9
10. IF YOU CHECKED OFF ANY PROBLEMS, HOW DIFFICULT HAVE THESE PROBLEMS MADE IT FOR YOU TO DO YOUR WORK, TAKE CARE OF THINGS AT HOME, OR GET ALONG WITH OTHER PEOPLE: NOT DIFFICULT AT ALL
SUM OF ALL RESPONSES TO PHQ QUESTIONS 1-9: 0
SUM OF ALL RESPONSES TO PHQ QUESTIONS 1-9: 0

## 2025-07-14 ASSESSMENT — SOCIAL DETERMINANTS OF HEALTH (SDOH): HOW OFTEN DO YOU GET TOGETHER WITH FRIENDS OR RELATIVES?: ONCE A WEEK

## 2025-07-14 ASSESSMENT — PAIN SCALES - GENERAL: PAINLEVEL_OUTOF10: NO PAIN (0)

## 2025-07-14 NOTE — PATIENT INSTRUCTIONS
Patient Education   Preventive Care Advice   This is general advice given by our system to help you stay healthy. However, your care team may have specific advice just for you. Please talk to your care team about your preventive care needs.  Nutrition  Eat 5 or more servings of fruits and vegetables each day.  Try wheat bread, brown rice and whole grain pasta (instead of white bread, rice, and pasta).  Get enough calcium and vitamin D. Check the label on foods and aim for 100% of the RDA (recommended daily allowance).  Lifestyle  Exercise at least 150 minutes each week  (30 minutes a day, 5 days a week).  Do muscle strengthening activities 2 days a week. These help control your weight and prevent disease.  No smoking.  Wear sunscreen to prevent skin cancer.  Have a dental exam and cleaning every 6 months.  Yearly exams  See your health care team every year to talk about:  Any changes in your health.  Any medicines your care team has prescribed.  Preventive care, family planning, and ways to prevent chronic diseases.  Shots (vaccines)   HPV shots (up to age 26), if you've never had them before.  Hepatitis B shots (up to age 59), if you've never had them before.  COVID-19 shot: Get this shot when it's due.  Flu shot: Get a flu shot every year.  Tetanus shot: Get a tetanus shot every 10 years.  Pneumococcal, hepatitis A, and RSV shots: Ask your care team if you need these based on your risk.  Shingles shot (for age 50 and up)  General health tests  Diabetes screening:  Starting at age 35, Get screened for diabetes at least every 3 years.  If you are younger than age 35, ask your care team if you should be screened for diabetes.  Cholesterol test: At age 39, start having a cholesterol test every 5 years, or more often if advised.  Bone density scan (DEXA): At age 50, ask your care team if you should have this scan for osteoporosis (brittle bones).  Hepatitis C: Get tested at least once in your life.  STIs (sexually  transmitted infections)  Before age 24: Ask your care team if you should be screened for STIs.  After age 24: Get screened for STIs if you're at risk. You are at risk for STIs (including HIV) if:  You are sexually active with more than one person.  You don't use condoms every time.  You or a partner was diagnosed with a sexually transmitted infection.  If you are at risk for HIV, ask about PrEP medicine to prevent HIV.  Get tested for HIV at least once in your life, whether you are at risk for HIV or not.  Cancer screening tests  Cervical cancer screening: If you have a cervix, begin getting regular cervical cancer screening tests starting at age 21.  Breast cancer scan (mammogram): If you've ever had breasts, begin having regular mammograms starting at age 40. This is a scan to check for breast cancer.  Colon cancer screening: It is important to start screening for colon cancer at age 45.  Have a colonoscopy test every 10 years (or more often if you're at risk) Or, ask your provider about stool tests like a FIT test every year or Cologuard test every 3 years.  To learn more about your testing options, visit:   .  For help making a decision, visit:   https://bit.ly/en79065.  Prostate cancer screening test: If you have a prostate, ask your care team if a prostate cancer screening test (PSA) at age 55 is right for you.  Lung cancer screening: If you are a current or former smoker ages 50 to 80, ask your care team if ongoing lung cancer screenings are right for you.  For informational purposes only. Not to replace the advice of your health care provider. Copyright   2023 [employer]. All rights reserved. Clinically reviewed by the [facility] Transitions Program. untapt [id number] - REV 01/24.

## 2025-07-14 NOTE — PROGRESS NOTES
Preventive Care Visit  [facility]  Jenifer Huerta DNP, Family Medicine  Jul 14, 2025      Assessment & Plan     Routine general medical examination at a health care facility  Routine physical.     FRANCESCA (generalized anxiety disorder)  Stable with Lexapro, but feels she does have some break through anxiety a couple times a week. Discussed Buspar, will start with PRN dosing and can increase to daily or BID if needed. She will reach out if she does decide to increase it.     - escitalopram (LEXAPRO) 20 MG tablet; Take 1 tablet (20 mg) by mouth daily.  - Comprehensive metabolic panel (BMP + Alb, Alk Phos, ALT, AST, Total. Bili, TP); Future  - busPIRone (BUSPAR) 5 MG tablet; Take 1 tablet (5 mg) by mouth 2 times daily as needed (anxiety).    Insomnia, unspecified type  Stable with Ambien once a month. She uses very rarely. Uses melatonin usually as needed.     - zolpidem (AMBIEN) 5 MG tablet; Take 0.5 tablets (2.5 mg) by mouth nightly as needed for sleep.    Overweight (BMI 25.0-29.9)  She feels she has plateaued on Wegovy. She is interested in switching to Zepbound. Starting with 5mg and will increase each month. Follow-up virtually in 3 months. Labs done today.     - Comprehensive metabolic panel (BMP + Alb, Alk Phos, ALT, AST, Total. Bili, TP); Future  - tirzepatide-Weight Management (ZEPBOUND) 5 MG/0.5ML prefilled pen; Inject 0.5 mLs (5 mg) subcutaneously every 7 days.  - tirzepatide-Weight Management (ZEPBOUND) 7.5 MG/0.5ML prefilled pen; Inject 0.5 mLs (7.5 mg) subcutaneously every 7 days.  - tirzepatide-Weight Management (ZEPBOUND) 10 MG/0.5ML prefilled pen; Inject 0.5 mLs (10 mg) subcutaneously every 7 days.    Hyperlipidemia LDL goal <100  Screening. She is fasting.     - Lipid panel reflex to direct LDL Fasting; Future    Visit for screening mammogram  Screening.     - MA Screening Bilateral w/ Joshua; Future    The longitudinal plan of care for the diagnosis(es)/condition(s) as  "documented were addressed during this visit. Due to the added complexity in care, I will continue to support Dasia in the subsequent management and with ongoing continuity of care.    Patient has been advised of split billing requirements and indicates understanding: Yes, reviewed by MA    BMI  Estimated body mass index is 26.63 kg/m  as calculated from the following:    Height as of this encounter: 1.671 m (5' 5.8\").    Weight as of this encounter: 74.4 kg (164 lb).   Weight management plan: Discussed healthy diet and exercise guidelines    Counseling  Appropriate preventive services were addressed with this patient via screening, questionnaire, or discussion as appropriate for fall prevention, nutrition, physical activity, Tobacco-use cessation, social engagement, weight loss and cognition.  Checklist reviewing preventive services available has been given to the patient.  Reviewed patient's diet, addressing concerns and/or questions.   She is at risk for lack of exercise and has been provided with information to increase physical activity for the benefit of her well-being.   Reviewed preventive health counseling, as reflected in patient instructions       Regular exercise       Healthy diet/nutrition       Osteoporosis prevention/bone health       Mammogram screening    Follow-up   Return in about 3 months (around 10/14/2025), or if symptoms worsen or fail to improve.     Follow-up Visit   Expected date:  Jul 14, 2026 (Approximate)      Follow Up Appointment Details:     Follow-up with whom?: PCP    Follow-Up for what?: Adult Preventive    How?: In Person                 Subjective   Dasia is a 50 year old, presenting for the following:  Physical and Recheck Medication        7/14/2025     8:19 AM   Additional Questions   Roomed by Melany   Accompanied by none         7/14/2025     8:19 AM   Patient Reported Additional Medications   Patient reports taking the following new medications none          HPI       Anxiety "   How are you doing with your anxiety since your last visit? Worsened   Are you having other symptoms that might be associated with anxiety? No  Have you had a significant life event? No   Are you feeling depressed? No  Do you have any concerns with your use of alcohol or other drugs? No    Social History     Tobacco Use    Smoking status: Former     Current packs/day: 0.00     Types: Cigarettes     Quit date: 2000     Years since quittin.8     Passive exposure: Never    Smokeless tobacco: Never   Vaping Use    Vaping status: Never Used   Substance Use Topics    Alcohol use: Yes     Comment: 5 drinks/week    Drug use: No         2024     8:37 AM 2025     7:47 AM 2025     8:16 AM   FRANCESCA-7 SCORE   Total Score 2 (minimal anxiety) 1 (minimal anxiety) 1 (minimal anxiety)   Total Score 2 1  1        Patient-reported         2024     8:11 AM 2025     7:47 AM 2025     8:15 AM   PHQ   PHQ-9 Total Score 1 1  0    Q9: Thoughts of better off dead/self-harm past 2 weeks Not at all Not at all Not at all       Patient-reported         2025     8:15 AM   Last PHQ-9   1.  Little interest or pleasure in doing things 0   2.  Feeling down, depressed, or hopeless 0   3.  Trouble falling or staying asleep, or sleeping too much 0   4.  Feeling tired or having little energy 0   5.  Poor appetite or overeating 0   6.  Feeling bad about yourself 0   7.  Trouble concentrating 0   8.  Moving slowly or restless 0   Q9: Thoughts of better off dead/self-harm past 2 weeks 0   PHQ-9 Total Score 0        Patient-reported         2025     8:16 AM   FRANCESCA-7    1. Feeling nervous, anxious, or on edge 1   2. Not being able to stop or control worrying 0   3. Worrying too much about different things 0   4. Trouble relaxing 0   5. Being so restless that it is hard to sit still 0   6. Becoming easily annoyed or irritable 0   7. Feeling afraid, as if something awful might happen 0   FRANCESCA-7 Total Score 1    If you  checked any problems, how difficult have they made it for you to do your work, take care of things at home, or get along with other people? Not difficult at all       Patient-reported     Medication Followup of Zolpidem, Wegovy  Taking Medication as prescribed: yes  Side Effects:  None  Medication Helping Symptoms:  yes      Additional provider notes: Patient presents for the following:     Annual prevent.     Depression/anxiety: taking lexapro 20mg. Feels anxiety is up a couple times a week.     Obesity: taking Wegovy 2.4mg. She feels she has plateaued. Goal weight 140-145lb.     Insomnia: taking Ambien 5mg. Uses very PRN. She uses melatonin most nights.     Allergies   Allergen Reactions    Codecon-C [Morphine And Codeine] Nausea and Vomiting       Current Outpatient Medications   Medication Sig Dispense Refill    busPIRone (BUSPAR) 5 MG tablet Take 1 tablet (5 mg) by mouth 2 times daily as needed (anxiety). 60 tablet 3    escitalopram (LEXAPRO) 20 MG tablet Take 1 tablet (20 mg) by mouth daily. 90 tablet 3    loratadine (CLARITIN) 10 MG tablet Take 1 tablet (10 mg) by mouth daily      MIRENA 20 MCG/24HR IU IUD   0    MULTI-VITAMIN OR TABS 1 TABLET DAILY 30 0    [START ON 9/14/2025] tirzepatide-Weight Management (ZEPBOUND) 10 MG/0.5ML prefilled pen Inject 0.5 mLs (10 mg) subcutaneously every 7 days. 2 mL 0    tirzepatide-Weight Management (ZEPBOUND) 5 MG/0.5ML prefilled pen Inject 0.5 mLs (5 mg) subcutaneously every 7 days. 2 mL 0    [START ON 8/14/2025] tirzepatide-Weight Management (ZEPBOUND) 7.5 MG/0.5ML prefilled pen Inject 0.5 mLs (7.5 mg) subcutaneously every 7 days. 2 mL 0    VITAMIN D, CHOLECALCIFEROL, PO Vitamin D      zolpidem (AMBIEN) 5 MG tablet Take 0.5 tablets (2.5 mg) by mouth nightly as needed for sleep. 20 tablet 0     No current facility-administered medications for this visit.       Past Medical History:   Diagnosis Date    Ovarian torsion         Advance Care Planning    Patient states has  Health Care Directive and will send to [facility].        2025   General Health   How would you rate your overall physical health? Good   Feel stress (tense, anxious, or unable to sleep) Only a little   (!) STRESS CONCERN      2025   Nutrition   Three or more servings of calcium each day? (!) I DON'T KNOW   Diet: Regular (no restrictions)   How many servings of fruit and vegetables per day? (!) 0-1   How many sweetened beverages each day? 0-1         2025   Exercise   Days per week of moderate/strenous exercise 2 days   (!) EXERCISE CONCERN      2025   Social Factors   Frequency of gathering with friends or relatives Once a week   Worry food won't last until get money to buy more No   Food not last or not have enough money for food? No   Do you have housing? (Housing is defined as stable permanent housing and does not include staying outside in a car, in a tent, in an abandoned building, in an overnight shelter, or couch-surfing.) Yes   Are you worried about losing your housing? No   Lack of transportation? No   Unable to get utilities (heat,electricity)? No         2025   Dental   Dentist two times every year? Yes       Today's PHQ-9 Score:       2025     8:15 AM   PHQ-9 SCORE   PHQ-9 Total Score MyChart 0   PHQ-9 Total Score 0        Patient-reported         2025   Substance Use   Alcohol more than 3/day or more than 7/wk No   Do you use any other substances recreationally? No     Social History     Tobacco Use    Smoking status: Former     Current packs/day: 0.00     Types: Cigarettes     Quit date: 2000     Years since quittin.8     Passive exposure: Never    Smokeless tobacco: Never   Vaping Use    Vaping status: Never Used   Substance Use Topics    Alcohol use: Yes     Comment: 5 drinks/week    Drug use: No           2024   LAST FHS-7 RESULTS   1st degree relative breast or ovarian cancer No   Any relative bilateral breast cancer No   Any male have breast  cancer No   Any ONE woman have BOTH breast AND ovarian cancer No   Any woman with breast cancer before 50yrs No   2 or more relatives with breast AND/OR ovarian cancer No   2 or more relatives with breast AND/OR bowel cancer No        Mammogram Screening - Mammogram every 1-2 years updated in Health Maintenance based on mutual decision making        2025   STI Screening   New sexual partner(s) since last STI/HIV test? No     History of abnormal Pap smear: No - age 30- 64 PAP with HPV every 5 years recommended        Latest Ref Rng & Units 3/3/2022     2:19 PM 10/15/2016    12:00 AM   PAP / HPV   PAP (Historical) Negative  Negative    PAP-ABSTRACT  See Scanned Document       ASCVD Risk   The 10-year ASCVD risk score (Erna CARRIZALES, et al., 2019) is: 0.8%    Values used to calculate the score:      Age: 50 years      Sex: Female      Is Non- : No      Diabetic: No      Tobacco smoker: No      Systolic Blood Pressure: 112 mmHg      Is BP treated: No      HDL Cholesterol: 73 mg/dL      Total Cholesterol: 239 mg/dL        2025   Contraception/Family Planning   Questions about contraception or family planning No   What are your periods like? (!) SPOTTING        Reviewed and updated as needed this visit by Provider     Meds                Past Medical History:   Diagnosis Date    Ovarian torsion      Past Surgical History:   Procedure Laterality Date    SURGICAL HISTORY OF -       right foot    SURGICAL HISTORY OF -       ovarian torsion. Able to save ovary. laparoscopy     OB History    Para Term  AB Living   2 2 0 0 0 0   SAB IAB Ectopic Multiple Live Births   0 0 0 0 0      # Outcome Date GA Lbr Deny/2nd Weight Sex Type Anes PTL Lv   2 Para            1 Para                  Review of Systems  Constitutional, HEENT, cardiovascular, pulmonary, gi and gu systems are negative, except as otherwise noted.     Objective    Exam  /75 (BP Location: Right  "arm, Patient Position: Sitting, Cuff Size: Adult Regular)   Pulse 70   Temp 98.1  F (36.7  C) (Oral)   Resp 12   Ht 1.671 m (5' 5.8\")   Wt 74.4 kg (164 lb)   SpO2 97%   BMI 26.63 kg/m     Estimated body mass index is 26.63 kg/m  as calculated from the following:    Height as of this encounter: 1.671 m (5' 5.8\").    Weight as of this encounter: 74.4 kg (164 lb).    Physical Exam  GENERAL: alert and no distress  EYES: Eyes grossly normal to inspection, PERRL and conjunctivae and sclerae normal  HENT: ear canals and TM's normal, nose and mouth without ulcers or lesions  NECK: no adenopathy, no asymmetry, masses, or scars  RESP: lungs clear to auscultation - no rales, rhonchi or wheezes  BREAST: normal without masses, tenderness or nipple discharge and no palpable axillary masses or adenopathy  BREAST: surgical scars present bilaterally  CV: regular rate and rhythm, normal S1 S2, no S3 or S4, no murmur, click or rub, no peripheral edema  ABDOMEN: soft, nontender, no hepatosplenomegaly, no masses and bowel sounds normal  MS: no gross musculoskeletal defects noted, no edema  SKIN: no suspicious lesions or rashes  NEURO: Normal strength and tone, mentation intact and speech normal  PSYCH: mentation appears normal, affect normal/bright        Signed Electronically by: Jenifer Huerta DNP    "

## 2025-07-15 ENCOUNTER — PATIENT OUTREACH (OUTPATIENT)
Dept: CARE COORDINATION | Facility: CLINIC | Age: 50
End: 2025-07-15
Payer: COMMERCIAL

## 2025-07-15 ENCOUNTER — TELEPHONE (OUTPATIENT)
Dept: FAMILY MEDICINE | Facility: CLINIC | Age: 50
End: 2025-07-15
Payer: COMMERCIAL

## 2025-07-15 DIAGNOSIS — E66.3 OVERWEIGHT (BMI 25.0-29.9): Primary | ICD-10-CM

## 2025-07-15 RX ORDER — TIRZEPATIDE 7.5 MG/.5ML
INJECTION, SOLUTION SUBCUTANEOUS
Refills: 0 | OUTPATIENT
Start: 2025-07-15

## 2025-07-15 NOTE — TELEPHONE ENCOUNTER
Pharmacy called, and stated that patient has been on wegovy last picked up on 6/4/25, for 2.4 mg.    They are wondering why Zepbound was sent into the pharmacy.    The preferred medication on her insurance is Wegovy, and Zepbound is not covered under her insurance.    Routed to PCP to please advise.    Tessy COHEN RN  Triage Nurse  [facility]

## 2025-07-15 NOTE — TELEPHONE ENCOUNTER
The Wegovy was discontinued, but there were 3 different doses of Zepbound sent to the pharmacy on 7/14/25. See MAR. Please reach out to pharmacy to make sure they received it. I wonder if they just looked up the wrong medication?    Thanks,  Jenifer Huerta, ASHLEY, NP-C

## 2025-07-15 NOTE — TELEPHONE ENCOUNTER
Routing to Jenifer Huerta, Patient calling in regards to her zepbound prescription.  Pharmacy said that the prescriber had discontinued the medication and patient was not able to  medication.  Jenifer Huerta, do you know what is going on with this medication?  We need to call patient once we figure something out?    Judith Romero [facility]

## 2025-07-16 ENCOUNTER — TELEPHONE (OUTPATIENT)
Dept: FAMILY MEDICINE | Facility: CLINIC | Age: 50
End: 2025-07-16
Payer: COMMERCIAL

## 2025-07-16 NOTE — TELEPHONE ENCOUNTER
Regarding pharmacy question: We are starting a prior auth as Wegovy is not working for weight loss at highest dose.     Please let patient know that insurance isn't covering it and I started a prior auth, but I don't know how long it will take. Does she want me to resend the Wegovy until we hear back about the Zepbound?    Thanks,  Jenifer Huerta DNP, NP-C

## 2025-07-16 NOTE — TELEPHONE ENCOUNTER
PRIOR AUTHORIZATION DENIED    Medication: ZEPBOUND 5 MG/0.5ML SC SOAJ  Insurance Company: [id number] - Phone [phone] Fax [phone]  Denial Date: 7/15/2025  Denial Reason(s):           Appeal Information:       Patient Notified: No

## 2025-07-16 NOTE — TELEPHONE ENCOUNTER
Patient has failed Wegovy can we please send in CMM to see if they will approve the Zepbound as Mounjaro?   Thank You!  Betty Mcelroy CPhT  Prior Auth Specialist

## 2025-07-16 NOTE — TELEPHONE ENCOUNTER
RN called patient/family and relayed provider's message. Patient would like the Wegovy sent for now and continue process for prior auth on zepbound.     AMADEO Shrestha  [facility]: Marjorie

## 2025-07-17 NOTE — TELEPHONE ENCOUNTER
Refill sent of Wegovy while waiting on Zepbound prior auth.     Thanks,  Jenifer Huerta, ASHLEY, NP-C

## 2025-08-13 ENCOUNTER — ANCILLARY PROCEDURE (OUTPATIENT)
Dept: MAMMOGRAPHY | Facility: CLINIC | Age: 50
End: 2025-08-13
Attending: NURSE PRACTITIONER
Payer: COMMERCIAL

## 2025-08-13 DIAGNOSIS — Z12.31 VISIT FOR SCREENING MAMMOGRAM: ICD-10-CM

## 2025-08-13 PROCEDURE — 77067 SCR MAMMO BI INCL CAD: CPT | Mod: TC | Performed by: RADIOLOGY

## 2025-08-13 PROCEDURE — 77063 BREAST TOMOSYNTHESIS BI: CPT | Mod: TC | Performed by: RADIOLOGY
